# Patient Record
Sex: FEMALE | Race: BLACK OR AFRICAN AMERICAN | NOT HISPANIC OR LATINO | Employment: FULL TIME | URBAN - NONMETROPOLITAN AREA
[De-identification: names, ages, dates, MRNs, and addresses within clinical notes are randomized per-mention and may not be internally consistent; named-entity substitution may affect disease eponyms.]

---

## 2021-02-25 LAB
BILIRUB SERPL-MCNC: NEGATIVE MG/DL
BLOOD URINE, POC: NORMAL
CLARITY, POC UA: CLEAR
COLOR, POC UA: YELLOW
GLUCOSE UR QL STRIP: NEGATIVE
LEUKOCYTE EST, POC UA: NORMAL
NITRITE, POC UA: NEGATIVE
PAP RECOMMENDATION EXT: NORMAL
PAP SMEAR: NORMAL
PH, POC UA: 5.5
POC BETA-HCG (QUAL): NEGATIVE
PROTEIN, POC: NEGATIVE
SPECIFIC GRAVITY, POC UA: 1.01
UROBILINOGEN, POC UA: NORMAL

## 2021-03-04 ENCOUNTER — HISTORICAL (OUTPATIENT)
Dept: ADMINISTRATIVE | Facility: HOSPITAL | Age: 26
End: 2021-03-04

## 2021-03-09 LAB — POC BETA-HCG (QUAL): NEGATIVE

## 2021-03-12 ENCOUNTER — HISTORICAL (OUTPATIENT)
Dept: ADMINISTRATIVE | Facility: HOSPITAL | Age: 26
End: 2021-03-12

## 2021-03-29 LAB — POC BETA-HCG (QUAL): NEGATIVE

## 2021-05-27 LAB
ALBUMIN SERPL-MCNC: 4.2 G/DL (ref 3.4–5)
ALBUMIN/GLOB SERPL: 1.5 {RATIO}
ALP SERPL-CCNC: 64 U/L (ref 50–144)
ALT SERPL-CCNC: 14 U/L (ref 1–45)
AMPHET UR QL SCN: NORMAL
ANION GAP SERPL CALC-SCNC: 6 MMOL/L (ref 7–16)
AST SERPL-CCNC: 21 U/L (ref 14–36)
BARBITURATE SCN PRESENT UR: NORMAL
BASOPHILS # BLD AUTO: 0.02 X10(3)/MCL (ref 0.01–0.08)
BASOPHILS NFR BLD AUTO: 0.3 % (ref 0.1–1.2)
BENZODIAZ UR QL SCN: NORMAL
BILIRUB SERPL-MCNC: 0.41 MG/DL (ref 0.1–1)
BUN SERPL-MCNC: 7 MG/DL (ref 7–20)
CALCIUM SERPL-MCNC: 9.6 MG/DL (ref 8.4–10.2)
CANNABINOIDS UR QL SCN: NORMAL
CHLORIDE SERPL-SCNC: 106 MMOL/L (ref 94–110)
CHOLEST SERPL-MCNC: 177 MG/DL (ref 0–200)
CO2 SERPL-SCNC: 29 MMOL/L (ref 21–32)
COCAINE UR QL SCN: NORMAL
CREAT SERPL-MCNC: 0.8 MG/DL (ref 0.52–1.04)
CREAT/UREA NIT SERPL: 8.8 (ref 12–20)
EOSINOPHIL # BLD AUTO: 0.18 X10(3)/MCL (ref 0.04–0.36)
EOSINOPHIL NFR BLD AUTO: 2.8 % (ref 0.7–7)
ERYTHROCYTE [DISTWIDTH] IN BLOOD BY AUTOMATED COUNT: 13.3 % (ref 11–14.5)
GLOBULIN SER-MCNC: 2.8 G/DL (ref 2–3.9)
GLUCOSE SERPL-MCNC: 110 MGM./DL (ref 70–115)
HCT VFR BLD AUTO: 40 % (ref 36–48)
HDLC SERPL-MCNC: 50 MG/DL (ref 40–60)
HGB BLD-MCNC: 12.9 G/DL (ref 11.8–16)
IMM GRANULOCYTES # BLD AUTO: 0.01 X10E3/UL (ref 0–0.03)
IMM GRANULOCYTES NFR BLD AUTO: 0.2 % (ref 0–0.5)
LDLC SERPL CALC-MCNC: 102.7 MG/DL (ref 30–100)
LYMPHOCYTES # BLD AUTO: 2.25 X10(3)/MCL (ref 1.16–3.74)
LYMPHOCYTES NFR BLD AUTO: 35.2 % (ref 20–55)
MCH RBC QN AUTO: 27.9 PG (ref 27–34)
MCHC RBC AUTO-ENTMCNC: 32.3 G/DL (ref 31–37)
MCV RBC AUTO: 86.6 FL (ref 79–99)
METHADONE UR QL SCN: NORMAL
MONOCYTES # BLD AUTO: 0.38 X10(3)/MCL (ref 0.24–0.36)
MONOCYTES NFR BLD AUTO: 5.9 % (ref 4.7–12.5)
NEUTROPHILS # BLD AUTO: 3.55 X10(3)/MCL (ref 1.56–6.13)
NEUTROPHILS NFR BLD AUTO: 55.6 % (ref 37–73)
OPIATES UR QL SCN: NORMAL
PCP UR QL: NORMAL
PLATELET # BLD AUTO: 370 X10(3)/MCL (ref 140–371)
PMV BLD AUTO: 10.1 FL (ref 9.4–12.4)
POTASSIUM SERPL-SCNC: 4.7 MMOL/L (ref 3.5–5.1)
PROT SERPL-MCNC: 7 G/DL (ref 6.3–8.2)
RBC # BLD AUTO: 4.62 X10(6)/MCL (ref 4–5.1)
SODIUM SERPL-SCNC: 141 MMOL/L (ref 135–145)
TRIGL SERPL-MCNC: 70 MG/DL (ref 30–200)
TSH SERPL-ACNC: 1.39 UIU/ML (ref 0.36–3.74)
WBC # SPEC AUTO: 6.4 X10(3)/MCL (ref 4–11.5)

## 2021-12-21 LAB
HIV 1+2 AB+HIV1 P24 AG SERPL QL IA: NORMAL
RPR SER QL: NORMAL

## 2022-01-16 ENCOUNTER — HISTORICAL (OUTPATIENT)
Dept: ADMINISTRATIVE | Facility: HOSPITAL | Age: 27
End: 2022-01-16

## 2022-04-10 ENCOUNTER — HISTORICAL (OUTPATIENT)
Dept: ADMINISTRATIVE | Facility: HOSPITAL | Age: 27
End: 2022-04-10

## 2022-04-11 ENCOUNTER — HISTORICAL (OUTPATIENT)
Dept: ADMINISTRATIVE | Facility: HOSPITAL | Age: 27
End: 2022-04-11

## 2022-04-27 LAB
AGE: 26
ALBUMIN SERPL-MCNC: 4.7 G/DL (ref 3.4–5)
ALBUMIN/GLOB SERPL: 1.7 {RATIO}
ALP SERPL-CCNC: 64 U/L (ref 50–144)
ALT SERPL-CCNC: 15 U/L (ref 1–45)
ANION GAP SERPL CALC-SCNC: 7 MMOL/L (ref 2–13)
AST SERPL-CCNC: 29 U/L (ref 14–36)
BASOPHILS # BLD AUTO: 0.03 10*3/UL (ref 0.01–0.08)
BASOPHILS NFR BLD AUTO: 0.4 % (ref 0.1–1.2)
BILIRUB SERPL-MCNC: 0.97 MG/DL (ref 0–1)
BUN SERPL-MCNC: 13 MG/DL (ref 7–20)
CALCIUM SERPL-MCNC: 9.6 MG/DL (ref 8.4–10.2)
CHLORIDE SERPL-SCNC: 105 MMOL/L (ref 98–110)
CO2 SERPL-SCNC: 28 MMOL/L (ref 21–32)
CREAT SERPL-MCNC: 1.11 MG/DL (ref 0.52–1.04)
CREAT/UREA NIT SERPL: 11.7 (ref 12–20)
EOSINOPHIL # BLD AUTO: 0.14 10*3/UL (ref 0.04–0.36)
EOSINOPHIL NFR BLD AUTO: 2 % (ref 0.7–7)
ERYTHROCYTE [DISTWIDTH] IN BLOOD BY AUTOMATED COUNT: 12.9 % (ref 11–14.5)
EST. AVERAGE GLUCOSE BLD GHB EST-MCNC: 120 MG/DL (ref 70–115)
GLOBULIN SER-MCNC: 2.7 G/DL (ref 2–3.9)
GLUCOSE SERPL-MCNC: 92 MG/DL (ref 70–115)
HBA1C MFR BLD: 6 % (ref 4–6)
HCT VFR BLD AUTO: 37.4 % (ref 36–48)
HGB BLD-MCNC: 12.8 G/DL (ref 11.8–16)
IMM GRANULOCYTES # BLD AUTO: 0.01 10*3/UL (ref 0–0.03)
IMM GRANULOCYTES NFR BLD AUTO: 0.1 % (ref 0–0.5)
LYMPHOCYTES # BLD AUTO: 2.6 10*3/UL (ref 1.16–3.74)
LYMPHOCYTES NFR BLD AUTO: 36.3 % (ref 20–55)
MANUAL DIFF? (OHS): NORMAL
MCH RBC QN AUTO: 27.6 PG (ref 27–34)
MCHC RBC AUTO-ENTMCNC: 34.2 G/DL (ref 31–37)
MCV RBC AUTO: 80.8 FL (ref 79–99)
MONOCYTES # BLD AUTO: 0.45 10*3/UL (ref 0.24–0.36)
MONOCYTES NFR BLD AUTO: 6.3 % (ref 4.7–12.5)
NEUTROPHILS # BLD AUTO: 3.94 10*3/UL (ref 1.56–6.13)
NEUTROPHILS NFR BLD AUTO: 54.9 % (ref 37–73)
PLATELET # BLD AUTO: 361 10*3/UL (ref 140–371)
PMV BLD AUTO: 9.5 FL (ref 9.4–12.4)
POTASSIUM SERPL-SCNC: 4.1 MMOL/L (ref 3.5–5.1)
PROT SERPL-MCNC: 7.4 G/DL (ref 6.3–8.2)
RBC # BLD AUTO: 4.63 10*6/UL (ref 4–5.1)
SODIUM SERPL-SCNC: 140 MMOL/L (ref 135–145)
WBC # SPEC AUTO: 7.2 10*3/UL (ref 4–11.5)

## 2022-04-28 VITALS
BODY MASS INDEX: 29.48 KG/M2 | OXYGEN SATURATION: 99 % | SYSTOLIC BLOOD PRESSURE: 122 MMHG | DIASTOLIC BLOOD PRESSURE: 62 MMHG | HEIGHT: 58 IN | WEIGHT: 140.44 LBS

## 2022-04-28 VITALS
BODY MASS INDEX: 29.48 KG/M2 | WEIGHT: 140.44 LBS | OXYGEN SATURATION: 99 % | HEIGHT: 58 IN | DIASTOLIC BLOOD PRESSURE: 62 MMHG | SYSTOLIC BLOOD PRESSURE: 122 MMHG

## 2022-05-03 ENCOUNTER — HISTORICAL (OUTPATIENT)
Dept: ADMINISTRATIVE | Facility: HOSPITAL | Age: 27
End: 2022-05-03

## 2022-05-03 NOTE — HISTORICAL [facility] CERNER
This is a historical note converted from Tobias. Formatting and pictures may have been removed.  Please reference Tobias for original formatting and attached multimedia. Chief Complaint  New patient, presents wellness exam and pap + STD testing and labs. ?LMP 2021, UPT negative. Desires permanent sterilization but wants to talk about other contraceptive options until then  Patient not found in Links-unknown Gardasil status  History of Present Illness  25-year-old?black female ?3 para?1 for initial GYN exam.? Patient does have a history of herpes and chlamydia.? Last menstrual period 2021 - usually cyclic but skipped November and December menstrual cycles.? no contraception at this time  atient considering permanent sterilization but does want to discuss other contraceptive options?as well.  adopted fiances 2 children, she and fisolitario do not want more children  c/o right sided breast pain, intermittent x 1 year, denies mass, nipple d/c, dimpling or puckering  MGM and PGM with breast cancer - both age 30s  Gynecological History  Last Menstrual Period: 21  Menstrual Status Intake: Menarcheal  STIs/STDs: Yes  Intermenstrual Bleeding: No  Abnormal Pap: No  Current Birth Control Method: Condoms  Dysmenorrhea: Moderate  HPV Vaccine: No  Flow: Moderate  Dyspareunia: No  Duration of Flow: 5  Postcoital Bleeding: No  Frequency of Cycle: irregular since 2020  Dysuria: No  Additional GYN Information: last pap about 3 years ago--wnl/pt+ hx of herpes and chlamydia  Discharge OB: + brownish, malodorous odor  Breast CA Hx: No  Sexually Active: Yes  Review of Systems  General/Constitutional:?  Chills?denies. Fatigue/weakness?denies?. Fever?denies?. Night sweats?denies?.  Skin:  Rash?denies.  Respiratory:  Cough?denies?. Hemoptysis?denies?. SOB?denies?. Sputum production?denies?. Wheezing?denies?.  Cardiovascular:  Chest pain?denies. Dizziness?denies. Palpitations?denies. Swelling in  hands/feet?denies.  Breast:  Changes in skin of nipple or breast?denies?. Breast lump?denies. Breast pain?denies. Nipple discharge?denies?.  Gastrointestinal:  Abdominal pain?denies?. Blood in stool?denies?. Constipation?denies?. Diarrhea?denies?. Heartburn?denies?. Nausea?denies?. Vomiting?deniesGenitourinary:  Incontinence?denies?. Blood in urine?denies. Frequent urination?denies?. Painful urination?denies.  Gyneocologic:  Irregular menses?denies. Heavy bleeding?denies. Painful menses?denies. Vaginal discharge/ Odor?denies?. Vaginal lesion/pain?denies. ?Pelvic pain?denies?. Decreased libido?denies. Vulvar lesion/ulcer?denies?. Prolapse of genital organs?denies?. Painful intercourse?denies?.  Menopausal:  Hot flushes?denies. Vaginal dryness?denies.  Musculoskeletal:  Joint/mona pain?denies. Decreased ROM?denies. Muscle aches?denies. Swollen joints?denies?. Weakness?denies.  Neurologic:  Confusion?denies. Trouble walking?denies. Trouble with balance?denies?Balance difficulty?denies. Gait abnormalities?denies. Headache?denies. Tingling/Numbness?denies.  Psychiatric:  Mood lability?denies.?Anxiety or panic attacks?denies. Depressed mood?denies. Suicidal thoughts?denies.?  Physical Exam  Vitals & Measurements  T:?36.3? ?C (Temporal Artery)? BP:?92/60?  HT:?147.00?cm? WT:?61.100?kg? BMI:?28.28? LMP:?01/23/2021 00:00 CST?  Chaperone: present  ?   General appearance: - healthy, well-nourished and well-developed  ?   Psychiatric: Orientation to time, place and person. Normal mood and affect and active, alert  ?   Skin:  Appearance: no rashes or lesions  ?   Neck:  Neck: supple, FROM, trachea midline. and no masses  Thyroid: no enlargement or nodules and non-tender.  ?  ?   Cardiovascular:  Auscultation: RRR and no murmur  Peripheral Vascular: no varicosities, LLE edema, RLE edema, calf tenderness, and palpable cord and pedal pulses intact  ?   Lungs:  Respiratory effort: no intercostal retractions or accessory muscle  usage  Auscultation: no wheezing, rales/crackles, or rhonchi and clear to auscultation  ?  Breast:  Inspection/Palpation: no tenderness, discrete/distinct masses, skin changes, or abnormal secretions. Nipple appearance normal.  ?  Abdomen:  Auscultation/Inspection/Palpation: no hepatomegaly, splenomegaly, masses , tenderness? or CVA tenderness and soft, non distended bowel sounds preset  Hernia: no palpable hernias  ?  Female Genitalia:  Vulva: no masses,?tenderness or?lesions  Bladder/Urethra: no urethral discharge or mass, normal meatus, bladder non-distended  Vagina: no tenderness, erythema, cystocele, rectocele, +abnormal vaginal discharge, or vesicle(s) or ulcers  Cervix: no discharge , no cervical lacerations noted or motion tenderness and grossly normal  Uterus: normal size and shape and midline, non-tender, and no uterine prolapse.  Adnexa/Parametria: no parametrial tenderness or mass, no adnexal tenderness or ovarian mass  ?  Lymph Nodes:  Palpation: non tender submandibular nodes, axillary nodes, or inguinal nodes  ?  Rectal Exam:  Rectum: normal perianal skin  Assessment/Plan  1.?Encounter for gynecological examination (general) (routine) with abnormal findings?Z01.411  ?Well woman:   - pap, gc/cz   - advised patient if she notices any changes to her breasts, including a lump, mass, dimpling, discharge, rash or tenderness, to should contact us immediately   - healthy diet, exercise   - multivitamin   - seat belt   - sunscreen use   - safe sex/ STI education   - contraception evaluation   - Gardasil evaluation   ?  ?  Ordered:  Clinic Follow up, *Est. 02/25/22 3:00:00 CST, Order for future visit, Encounter for gynecological examination (general) (routine) with abnormal findings, EBENEZER Gamble KARLY  Counts include 234 beds at the [facility] 18-39 years [id number] , Encounter for gynecological examination (general) (routine) with abnormal findings, EBENEZER GE, 02/25/21 11:17:00 CST  ?  2.?Family history of breast  cancer in female?Z80.3  ?MMG - baseline  ?  3.?BV (bacterial vaginosis)?N76.0  ?flagyl  ?  4.?Contraceptive management?Z30.9  ?IUD - to be ordered and inserted during menses  ?  Referrals  Clinic Follow up, *Est. 22 3:00:00 CST, Order for future visit, Encounter for gynecological examination (general) (routine) with abnormal findings, EBENEZER Gamble [facility]   OB History  Pregnancy History???(1,0,2,1)?? ??  Pregnancy # 1  Baby 1?????????????Outcome Date:? ??? Outcome:?Fetal Death  ???Outcome or Result:?Ectopic, Laparotomy  ???Gender:?--????????Gest Age:?Unknown ??????Wt:?--  ???Hospital:?--????????Jadiel Labor:?--  ???Samy Name:?--?????Babys Father:?--  ?  Pregnancy # 2  Baby 1?????????????Outcome Date:? ??? Outcome:?Fetal Death  ???Outcome or Result:?Spontaneous  with D&C  ???Gender:?--????????Gest Age:?Unknown ??????Wt:?--  ???Hospital:?--????????Jad[facility] Labor:?--  ???Samy Name:?--?????Babys Father:?--  ?  Pregnancy # 3  Baby 1?????????????Outcome Date:?2019????? Outcome:?Live Birth  ???Outcome or Result:?Vaginal  ???Gender:?Male????????Gest Age:?Fullterm ??????Wt:??2381 g  ???Hospital:?--????????Jad[facility] Labor:?--  ???Samy Name:?--?????Babys Father:?--  ???Anesthesia Type:?Epidural  Problem List/Past Medical History  Ongoing  Anxiety  Depression  Herpes genitalis  Historical  Pregnant  Pregnant  Pregnant  Procedure/Surgical History  D&C - Dilatation and curettage ()  Ectopic, laparotomy ()   Medications  clonazePAM 0.5 mg oral tablet, 0.5 mg= 1 tab(s), Oral, BID  escitalopram 20 mg oral tablet, 20 mg= 1 tab(s), Oral, Daily  Allergies  traMADol?(Vertigo)  Social History  Abuse/Neglect  No, 2021  Alcohol  Current, 1-2 times per month, 2021  Sexual  Sexually active: Yes. Other contraceptive use: condoms. Yes, 2021  Tobacco  Never (less than 100 in lifetime), N/A, 2021  Marital Status  Single  Family History  Breast cancer: Maternal  Grandmother and Paternal Grandmother.  Health Maintenance  Health Maintenance  ???Pending?(in the next year)  ??? ??Due?  ??? ? ? ?Alcohol Misuse Screening due??01/02/21??and every 1??year(s)  ??? ? ? ?ADL Screening due??02/25/21??and every 1??year(s)  ??? ? ? ?Tetanus Vaccine due??02/25/21??and every 10??year(s)  ??? ??Due In Future?  ??? ? ? ?Obesity Screening not due until??01/01/22??and every 1??year(s)  ???Satisfied?(in the past 1 year)  ??? ??Satisfied?  ??? ? ? ?Blood Pressure Screening on??02/25/21.??Satisfied by Sole Cazares LPN  ??? ? ? ?Body Mass Index Check on??02/25/21.??Satisfied by Sole Cazares LPN  ??? ? ? ?Influenza Vaccine on??02/25/21.??Satisfied by Sage CANO Sole  ??? ? ? ?Obesity Screening on??02/25/21.??Satisfied by Sage CANO Sole  ?

## 2022-06-10 ENCOUNTER — HISTORICAL (OUTPATIENT)
Dept: ADMINISTRATIVE | Facility: HOSPITAL | Age: 27
End: 2022-06-10

## 2022-06-24 DIAGNOSIS — K42.9 UMBILICAL HERNIA WITHOUT OBSTRUCTION OR GANGRENE: Primary | ICD-10-CM

## 2022-06-27 ENCOUNTER — CLINICAL SUPPORT (OUTPATIENT)
Dept: RESPIRATORY THERAPY | Facility: HOSPITAL | Age: 27
End: 2022-06-27
Attending: SURGERY
Payer: MEDICAID

## 2022-06-27 ENCOUNTER — HOSPITAL ENCOUNTER (OUTPATIENT)
Dept: RADIOLOGY | Facility: HOSPITAL | Age: 27
Discharge: HOME | End: 2022-06-27
Attending: SURGERY
Payer: MEDICAID

## 2022-06-27 DIAGNOSIS — Z01.811 PRE-OP CHEST EXAM: ICD-10-CM

## 2022-06-27 DIAGNOSIS — K42.9 UMBILICAL HERNIA WITHOUT OBSTRUCTION OR GANGRENE: ICD-10-CM

## 2022-06-27 PROCEDURE — 93005 ELECTROCARDIOGRAM TRACING: CPT

## 2022-06-27 PROCEDURE — 71046 X-RAY EXAM CHEST 2 VIEWS: CPT | Mod: TC

## 2022-06-27 RX ORDER — HYDROXYZINE HYDROCHLORIDE 50 MG/1
1 TABLET, FILM COATED ORAL NIGHTLY
COMMUNITY
Start: 2022-06-13 | End: 2023-03-31 | Stop reason: ALTCHOICE

## 2022-06-27 RX ORDER — METFORMIN HYDROCHLORIDE 500 MG/1
500 TABLET, EXTENDED RELEASE ORAL NIGHTLY
COMMUNITY
Start: 2022-06-07 | End: 2023-03-31 | Stop reason: ALTCHOICE

## 2022-06-27 RX ORDER — BUSPIRONE HYDROCHLORIDE 5 MG/1
5 TABLET ORAL 3 TIMES DAILY
COMMUNITY
Start: 2022-06-13 | End: 2023-03-31 | Stop reason: ALTCHOICE

## 2022-06-27 RX ORDER — VALACYCLOVIR HYDROCHLORIDE 1 G/1
1000 TABLET, FILM COATED ORAL 2 TIMES DAILY PRN
COMMUNITY
Start: 2022-06-15 | End: 2023-06-14 | Stop reason: SDUPTHER

## 2022-06-27 RX ORDER — SERTRALINE HYDROCHLORIDE 100 MG/1
100 TABLET, FILM COATED ORAL NIGHTLY
COMMUNITY
Start: 2022-06-13 | End: 2023-03-31 | Stop reason: SDUPTHER

## 2022-06-27 NOTE — DISCHARGE INSTRUCTIONS
Use CHG wipes as directed. Follow prep on Wednesday. Nothing to eat or drink after midnight.     Discharge instructions:  Showers only, no baths, no swimming, keep incisions clean with soap and water daily, may take gas-x and/ or stool softener daily if needed, do not lift heavier than 10 lbs until seen by Dr. Duran, wear abdominal binder at all times except in shower

## 2022-06-29 ENCOUNTER — ANESTHESIA EVENT (OUTPATIENT)
Dept: SURGERY | Facility: HOSPITAL | Age: 27
End: 2022-06-29
Payer: MEDICAID

## 2022-06-29 NOTE — ANESTHESIA PREPROCEDURE EVALUATION
06/29/2022  Anahi Reza is a 26 y.o., female.      Pre-op Assessment    I have reviewed the Patient Summary Reports.     I have reviewed the Nursing Notes. I have reviewed the NPO Status.   I have reviewed the Medications.     Review of Systems  Anesthesia Hx:  Denies Family Hx of Anesthesia complications.   Denies Personal Hx of Anesthesia complications.   Social:  Non-Smoker, Alcohol Use    Hematology/Oncology:  Hematology Normal   Oncology Normal     EENT/Dental:EENT/Dental Normal   Cardiovascular:  Cardiovascular Normal     Pulmonary:  Pulmonary Normal    Hepatic/GI:  Hepatic/GI Normal    Musculoskeletal:  Musculoskeletal Normal    Neurological:  Neurology Normal    Endocrine:  Endocrine Normal    Dermatological:  Skin Normal    Psych:   Psychiatric History          Physical Exam  General: Cooperative, Alert and Oriented    Airway:  Mallampati: II   Mouth Opening: Normal  TM Distance: Normal  Tongue: Normal  Neck ROM: Normal ROM    Dental:  Intact        Anesthesia Plan  Type of Anesthesia, risks & benefits discussed:    Anesthesia Type: Gen ETT  Intra-op Monitoring Plan: Standard ASA Monitors  Induction:  IV  Airway Plan: Direct  Informed Consent: Informed consent signed with the Patient and all parties understand the risks and agree with anesthesia plan.  All questions answered. Patient consented to blood products? Yes  ASA Score: 1    Ready For Surgery From Anesthesia Perspective.     .

## 2022-06-30 ENCOUNTER — ANESTHESIA (OUTPATIENT)
Dept: SURGERY | Facility: HOSPITAL | Age: 27
End: 2022-06-30
Payer: MEDICAID

## 2022-06-30 ENCOUNTER — HOSPITAL ENCOUNTER (OUTPATIENT)
Facility: HOSPITAL | Age: 27
Discharge: HOME | End: 2022-06-30
Attending: SURGERY | Admitting: SURGERY
Payer: MEDICAID

## 2022-06-30 VITALS
OXYGEN SATURATION: 96 % | BODY MASS INDEX: 30.23 KG/M2 | HEART RATE: 97 BPM | HEIGHT: 58 IN | RESPIRATION RATE: 20 BRPM | SYSTOLIC BLOOD PRESSURE: 113 MMHG | TEMPERATURE: 97 F | WEIGHT: 144 LBS | DIASTOLIC BLOOD PRESSURE: 67 MMHG

## 2022-06-30 DIAGNOSIS — K42.9 UMBILICAL HERNIA WITHOUT OBSTRUCTION AND WITHOUT GANGRENE: Primary | ICD-10-CM

## 2022-06-30 DIAGNOSIS — K42.9 UMBILICAL HERNIA: ICD-10-CM

## 2022-06-30 LAB — B-HCG SERPL QL: NEGATIVE

## 2022-06-30 PROCEDURE — 25000003 PHARM REV CODE 250: Performed by: SURGERY

## 2022-06-30 PROCEDURE — 37000008 HC ANESTHESIA 1ST 15 MINUTES: Performed by: SURGERY

## 2022-06-30 PROCEDURE — 63600175 PHARM REV CODE 636 W HCPCS: Performed by: ANESTHESIOLOGY

## 2022-06-30 PROCEDURE — 63600175 PHARM REV CODE 636 W HCPCS: Performed by: SURGERY

## 2022-06-30 PROCEDURE — 27201423 OPTIME MED/SURG SUP & DEVICES STERILE SUPPLY: Performed by: SURGERY

## 2022-06-30 PROCEDURE — 25000003 PHARM REV CODE 250: Performed by: ANESTHESIOLOGY

## 2022-06-30 PROCEDURE — 00790 ANES IPER UPR ABD NOS: CPT | Performed by: SURGERY

## 2022-06-30 PROCEDURE — 71000015 HC POSTOP RECOV 1ST HR: Performed by: SURGERY

## 2022-06-30 PROCEDURE — C1781 MESH (IMPLANTABLE): HCPCS | Performed by: SURGERY

## 2022-06-30 PROCEDURE — 71000016 HC POSTOP RECOV ADDL HR: Performed by: SURGERY

## 2022-06-30 PROCEDURE — 81025 URINE PREGNANCY TEST: CPT | Performed by: SURGERY

## 2022-06-30 PROCEDURE — 25000003 PHARM REV CODE 250: Performed by: NURSE ANESTHETIST, CERTIFIED REGISTERED

## 2022-06-30 PROCEDURE — 36000710: Performed by: SURGERY

## 2022-06-30 PROCEDURE — 37000009 HC ANESTHESIA EA ADD 15 MINS: Performed by: SURGERY

## 2022-06-30 PROCEDURE — 71000033 HC RECOVERY, INTIAL HOUR: Performed by: SURGERY

## 2022-06-30 PROCEDURE — 36000711: Performed by: SURGERY

## 2022-06-30 PROCEDURE — 63600175 PHARM REV CODE 636 W HCPCS: Performed by: NURSE ANESTHETIST, CERTIFIED REGISTERED

## 2022-06-30 DEVICE — MESH LAP PG 10X15 DF FLATSHEET: Type: IMPLANTABLE DEVICE | Site: ABDOMEN | Status: FUNCTIONAL

## 2022-06-30 RX ORDER — SODIUM CHLORIDE, SODIUM LACTATE, POTASSIUM CHLORIDE, CALCIUM CHLORIDE 600; 310; 30; 20 MG/100ML; MG/100ML; MG/100ML; MG/100ML
INJECTION, SOLUTION INTRAVENOUS CONTINUOUS
Status: ACTIVE | OUTPATIENT
Start: 2022-06-30

## 2022-06-30 RX ORDER — ONDANSETRON 2 MG/ML
INJECTION INTRAMUSCULAR; INTRAVENOUS
Status: DISCONTINUED | OUTPATIENT
Start: 2022-06-30 | End: 2022-06-30

## 2022-06-30 RX ORDER — SODIUM CHLORIDE 9 MG/ML
INJECTION, SOLUTION INTRAVENOUS CONTINUOUS
Status: DISCONTINUED | OUTPATIENT
Start: 2022-06-30 | End: 2022-06-30 | Stop reason: HOSPADM

## 2022-06-30 RX ORDER — PHENYLEPHRINE HYDROCHLORIDE 10 MG/ML
INJECTION INTRAVENOUS
Status: DISCONTINUED | OUTPATIENT
Start: 2022-06-30 | End: 2022-06-30

## 2022-06-30 RX ORDER — PROPOFOL 10 MG/ML
VIAL (ML) INTRAVENOUS
Status: DISCONTINUED | OUTPATIENT
Start: 2022-06-30 | End: 2022-06-30

## 2022-06-30 RX ORDER — GABAPENTIN 300 MG/1
600 CAPSULE ORAL
Status: COMPLETED | OUTPATIENT
Start: 2022-06-30 | End: 2022-06-30

## 2022-06-30 RX ORDER — ROCURONIUM BROMIDE 10 MG/ML
INJECTION, SOLUTION INTRAVENOUS
Status: DISCONTINUED | OUTPATIENT
Start: 2022-06-30 | End: 2022-06-30

## 2022-06-30 RX ORDER — SODIUM CHLORIDE 0.9 % (FLUSH) 0.9 %
10 SYRINGE (ML) INJECTION
Status: ACTIVE | OUTPATIENT
Start: 2022-06-30

## 2022-06-30 RX ORDER — ONDANSETRON 2 MG/ML
4 INJECTION INTRAMUSCULAR; INTRAVENOUS ONCE
Status: CANCELLED | OUTPATIENT
Start: 2022-06-30

## 2022-06-30 RX ORDER — ONDANSETRON 2 MG/ML
4 INJECTION INTRAMUSCULAR; INTRAVENOUS ONCE
Status: COMPLETED | OUTPATIENT
Start: 2022-06-30 | End: 2022-06-30

## 2022-06-30 RX ORDER — FENTANYL CITRATE 50 UG/ML
INJECTION, SOLUTION INTRAMUSCULAR; INTRAVENOUS
Status: DISCONTINUED | OUTPATIENT
Start: 2022-06-30 | End: 2022-06-30

## 2022-06-30 RX ORDER — DEXAMETHASONE SODIUM PHOSPHATE 4 MG/ML
INJECTION, SOLUTION INTRA-ARTICULAR; INTRALESIONAL; INTRAMUSCULAR; INTRAVENOUS; SOFT TISSUE
Status: DISCONTINUED | OUTPATIENT
Start: 2022-06-30 | End: 2022-06-30

## 2022-06-30 RX ORDER — BUPIVACAINE HYDROCHLORIDE 5 MG/ML
INJECTION, SOLUTION EPIDURAL; INTRACAUDAL
Status: DISCONTINUED | OUTPATIENT
Start: 2022-06-30 | End: 2022-06-30 | Stop reason: HOSPADM

## 2022-06-30 RX ORDER — CEFAZOLIN SODIUM 1 G/3ML
INJECTION, POWDER, FOR SOLUTION INTRAMUSCULAR; INTRAVENOUS
Status: DISCONTINUED | OUTPATIENT
Start: 2022-06-30 | End: 2022-06-30

## 2022-06-30 RX ORDER — CEFAZOLIN SODIUM 2 G/50ML
2 SOLUTION INTRAVENOUS
Status: DISPENSED | OUTPATIENT
Start: 2022-06-30 | End: 2022-07-01

## 2022-06-30 RX ORDER — LIDOCAINE HYDROCHLORIDE 20 MG/ML
INJECTION, SOLUTION EPIDURAL; INFILTRATION; INTRACAUDAL; PERINEURAL
Status: DISCONTINUED | OUTPATIENT
Start: 2022-06-30 | End: 2022-06-30

## 2022-06-30 RX ORDER — ACETAMINOPHEN 500 MG
1000 TABLET ORAL
Status: COMPLETED | OUTPATIENT
Start: 2022-06-30 | End: 2022-06-30

## 2022-06-30 RX ORDER — HYDROCODONE BITARTRATE AND ACETAMINOPHEN 5; 325 MG/1; MG/1
1 TABLET ORAL EVERY 6 HOURS PRN
Status: DISCONTINUED | OUTPATIENT
Start: 2022-06-30 | End: 2022-06-30 | Stop reason: HOSPADM

## 2022-06-30 RX ORDER — KETOROLAC TROMETHAMINE 30 MG/ML
30 INJECTION, SOLUTION INTRAMUSCULAR; INTRAVENOUS ONCE
Status: COMPLETED | OUTPATIENT
Start: 2022-06-30 | End: 2022-06-30

## 2022-06-30 RX ADMIN — ONDANSETRON 4 MG: 2 INJECTION INTRAMUSCULAR; INTRAVENOUS at 08:06

## 2022-06-30 RX ADMIN — DEXAMETHASONE SODIUM PHOSPHATE 8 MG: 4 INJECTION, SOLUTION INTRA-ARTICULAR; INTRALESIONAL; INTRAMUSCULAR; INTRAVENOUS; SOFT TISSUE at 04:06

## 2022-06-30 RX ADMIN — KETOROLAC TROMETHAMINE 30 MG: 30 INJECTION, SOLUTION INTRAMUSCULAR; INTRAVENOUS at 08:06

## 2022-06-30 RX ADMIN — ONDANSETRON 4 MG: 2 INJECTION INTRAMUSCULAR; INTRAVENOUS at 04:06

## 2022-06-30 RX ADMIN — ACETAMINOPHEN 1000 MG: 500 TABLET, FILM COATED ORAL at 12:06

## 2022-06-30 RX ADMIN — ROCURONIUM BROMIDE 30 MG: 10 INJECTION, SOLUTION INTRAVENOUS at 04:06

## 2022-06-30 RX ADMIN — PHENYLEPHRINE HYDROCHLORIDE 200 MCG: 10 INJECTION INTRAVENOUS at 05:06

## 2022-06-30 RX ADMIN — FENTANYL CITRATE 100 MCG: 50 INJECTION, SOLUTION INTRAMUSCULAR; INTRAVENOUS at 04:06

## 2022-06-30 RX ADMIN — SUGAMMADEX 200 MG: 100 INJECTION, SOLUTION INTRAVENOUS at 06:06

## 2022-06-30 RX ADMIN — CEFAZOLIN 2 G: 330 INJECTION, POWDER, FOR SOLUTION INTRAMUSCULAR; INTRAVENOUS at 04:06

## 2022-06-30 RX ADMIN — LIDOCAINE HYDROCHLORIDE 50 MG: 20 INJECTION, SOLUTION EPIDURAL; INFILTRATION; INTRACAUDAL; PERINEURAL at 04:06

## 2022-06-30 RX ADMIN — HYDROCODONE BITARTRATE AND ACETAMINOPHEN 1 TABLET: 5; 325 TABLET ORAL at 08:06

## 2022-06-30 RX ADMIN — PROPOFOL 150 MG: 10 INJECTION, EMULSION INTRAVENOUS at 04:06

## 2022-06-30 RX ADMIN — ROCURONIUM BROMIDE 20 MG: 10 INJECTION, SOLUTION INTRAVENOUS at 05:06

## 2022-06-30 RX ADMIN — GABAPENTIN 600 MG: 300 CAPSULE ORAL at 12:06

## 2022-06-30 RX ADMIN — SODIUM CHLORIDE, POTASSIUM CHLORIDE, SODIUM LACTATE AND CALCIUM CHLORIDE: 600; 310; 30; 20 INJECTION, SOLUTION INTRAVENOUS at 12:06

## 2022-06-30 NOTE — TRANSFER OF CARE
Anesthesia Transfer of Care Note    Patient: Anahi Reza    Procedure(s) Performed: Procedure(s) (LRB):  ROBOTIC REPAIR, HERNIA, UMBILICAL (N/A)    Patient location: PACU    Anesthesia Type: general    Transport from OR: Transported from OR on room air with adequate spontaneous ventilation    Post pain: adequate analgesia    Post assessment: no apparent anesthetic complications    Post vital signs: stable    Level of consciousness: unresponsive    Nausea/Vomiting: no nausea/vomiting    Complications: none    Transfer of care protocol was followed      Last vitals:

## 2022-06-30 NOTE — ANESTHESIA POSTPROCEDURE EVALUATION
Anesthesia Post Evaluation    Patient: Anahi Reza    Procedure(s) Performed: Procedure(s) (LRB):  ROBOTIC REPAIR, HERNIA, UMBILICAL (N/A)    Final Anesthesia Type: general      Patient location during evaluation: PACU  Patient participation: Yes- Able to Participate  Level of consciousness: awake and alert and oriented  Post-procedure vital signs: reviewed and stable  Pain management: adequate  Airway patency: patent    PONV status at discharge: No PONV  Anesthetic complications: no      Cardiovascular status: stable  Respiratory status: unassisted, spontaneous ventilation and room air  Hydration status: euvolemic  Follow-up not needed.          Vitals Value Taken Time   BP  06/30/22 1837   Temp  06/30/22 1837   Pulse  06/30/22 1837   Resp  06/30/22 1837   SpO2  06/30/22 1837         No case tracking events are documented in the log.      Pain/Aparna Score: Pain Rating Prior to Med Admin: 0 (6/30/2022 12:51 PM)

## 2022-07-01 NOTE — DISCHARGE SUMMARY
[facility] General - Periop Services  Discharge Note  Short Stay    Procedure(s) (LRB):  ROBOTIC REPAIR, HERNIA, UMBILICAL (N/A)    OUTCOME: Patient tolerated treatment/procedure well without complication and is now ready for discharge.    DISPOSITION: Home or Self Care    FINAL DIAGNOSIS:  Umbilical hernia without obstruction and without gangrene    FOLLOWUP: In clinic    DISCHARGE INSTRUCTIONS:    Discharge Procedure Orders   Diet general         Clinical Reference Documents Added to Patient Instructions       Document    UMBILICAL HERNIA, ADULT (ENGLISH)          TIME SPENT ON DISCHARGE:   5 minutes

## 2022-07-01 NOTE — OP NOTE
[facility] General - Periop Services  Operative Note      Date of Procedure: 6/30/2022     Procedure: Procedure(s) (LRB):  ROBOTIC REPAIR, HERNIA, UMBILICAL (N/A)     Surgeon(s) and Role:     * Isiah Duran MD - Primary    Assisting Surgeon: None    Pre-Operative Diagnosis:  Umbilical hernia nonobstructed not incarcerated    Post-Operative Diagnosis: Post-Op Diagnosis Codes:     * Umbilical hernia without obstruction and without gangrene [K42.9]  Anesthesia: General    Operative Findings (including complications, if any):  Patient is a [age]-year-old  female with a history of diabetes on metformin anxiety depression 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    Patient was brought up from supine position general anesthetic prepped and draped in sterile fashion had a left upper quadrant incision made with insertion of the Veress needle .  5 mm trocar was placed under direct vision and then 3 8 mm robotic trocars were placed in the left lateral position at at 80 degree turn to the right with left side up patient then was noted to have a very loose attenuated fascia a small umbilical hernia was noted and oversewn transversely with a running Prolene peritoneal pocket was created a 10 x 15 cm ProGrip  mesh was sutured into place with absorbable V lock suture 2-0.  Patient had the peritoneum reapproximated over the mesh .  The adhesive side was toward the posterior rectus fascia the nonadhesive side was toward the peritoneum      Description of Technical Procedures:  Noted above     Significant Surgical Tasks Conducted by the Assistant(s), if Applicable:  None     Estimated Blood Loss (EBL): * No values recorded between 6/30/2022  4:56 PM and 6/30/2022  6:32 PM *           Implants:   Implant Name Type Inv. Item Serial No.  Lot No. LRB No. Used Action   MESH LAP PG [id number] [facility] - [id number]  MESH LAP PG  10X15  LELE  JUSTIN [id number] N/A 1 Implanted       Specimens:   Specimen (24h ago, onward)            None                  Condition: Good    Disposition: PACU - hemodynamically stable.    Attestation: I was present and scrubbed for the entire procedure.    Discharge Note    OUTCOME: Patient tolerated treatment/procedure well without complication and is now ready for discharge.    DISPOSITION: Home or Self Care    FINAL DIAGNOSIS:  Umbilical hernia without obstruction and without gangrene    FOLLOWUP: In clinic    DISCHARGE INSTRUCTIONS:    Discharge Procedure Orders   Diet general        Clinical Reference Documents Added to Patient Instructions       Document    UMBILICAL HERNIA, ADULT (ENGLISH)

## 2022-08-02 ENCOUNTER — HISTORICAL (OUTPATIENT)
Dept: ADMINISTRATIVE | Facility: HOSPITAL | Age: 27
End: 2022-08-02

## 2022-09-21 ENCOUNTER — HISTORICAL (OUTPATIENT)
Dept: ADMINISTRATIVE | Facility: HOSPITAL | Age: 27
End: 2022-09-21
Payer: MEDICAID

## 2022-09-30 ENCOUNTER — HISTORICAL (OUTPATIENT)
Dept: ADMINISTRATIVE | Facility: HOSPITAL | Age: 27
End: 2022-09-30

## 2022-10-24 ENCOUNTER — HISTORICAL (OUTPATIENT)
Dept: ADMINISTRATIVE | Facility: HOSPITAL | Age: 27
End: 2022-10-24

## 2023-02-01 ENCOUNTER — DOCUMENTATION ONLY (OUTPATIENT)
Dept: ADMINISTRATIVE | Facility: HOSPITAL | Age: 28
End: 2023-02-01
Payer: MEDICAID

## 2023-03-31 ENCOUNTER — OFFICE VISIT (OUTPATIENT)
Dept: FAMILY MEDICINE | Facility: CLINIC | Age: 28
End: 2023-03-31
Payer: MEDICAID

## 2023-03-31 ENCOUNTER — OFFICE VISIT (OUTPATIENT)
Dept: BEHAVIORAL HEALTH | Facility: CLINIC | Age: 28
End: 2023-03-31
Payer: MEDICAID

## 2023-03-31 VITALS
BODY MASS INDEX: 33.97 KG/M2 | HEIGHT: 58 IN | SYSTOLIC BLOOD PRESSURE: 110 MMHG | DIASTOLIC BLOOD PRESSURE: 80 MMHG | TEMPERATURE: 98 F | WEIGHT: 161.81 LBS | OXYGEN SATURATION: 98 % | HEART RATE: 90 BPM

## 2023-03-31 VITALS
TEMPERATURE: 98 F | OXYGEN SATURATION: 97 % | SYSTOLIC BLOOD PRESSURE: 110 MMHG | BODY MASS INDEX: 33.37 KG/M2 | HEART RATE: 87 BPM | HEIGHT: 58 IN | DIASTOLIC BLOOD PRESSURE: 70 MMHG | WEIGHT: 159 LBS

## 2023-03-31 DIAGNOSIS — F43.10 PTSD (POST-TRAUMATIC STRESS DISORDER): Primary | Chronic | ICD-10-CM

## 2023-03-31 DIAGNOSIS — K21.9 GASTROESOPHAGEAL REFLUX DISEASE, UNSPECIFIED WHETHER ESOPHAGITIS PRESENT: Primary | ICD-10-CM

## 2023-03-31 DIAGNOSIS — F33.9 RECURRENT MAJOR DEPRESSIVE DISORDER, REMISSION STATUS UNSPECIFIED: Chronic | ICD-10-CM

## 2023-03-31 DIAGNOSIS — F41.1 GENERALIZED ANXIETY DISORDER: Chronic | ICD-10-CM

## 2023-03-31 DIAGNOSIS — K31.84 GASTROPARESIS: ICD-10-CM

## 2023-03-31 DIAGNOSIS — G47.00 INSOMNIA, UNSPECIFIED TYPE: Chronic | ICD-10-CM

## 2023-03-31 PROBLEM — F33.1 MODERATE EPISODE OF RECURRENT MAJOR DEPRESSIVE DISORDER: Status: ACTIVE | Noted: 2023-03-31

## 2023-03-31 PROBLEM — F43.12 CHRONIC POST-TRAUMATIC STRESS DISORDER (PTSD): Status: ACTIVE | Noted: 2023-03-31

## 2023-03-31 PROBLEM — R73.03 PREDIABETES: Status: ACTIVE | Noted: 2023-03-31

## 2023-03-31 PROBLEM — K42.9 UMBILICAL HERNIA: Status: ACTIVE | Noted: 2022-06-30

## 2023-03-31 PROBLEM — Z62.810 HISTORY OF SEXUAL ABUSE IN CHILDHOOD: Status: ACTIVE | Noted: 2023-03-31

## 2023-03-31 PROBLEM — Z91.51 HISTORY OF SUICIDE ATTEMPT: Status: ACTIVE | Noted: 2023-03-31

## 2023-03-31 PROBLEM — A60.00 GENITAL HERPES SIMPLEX: Status: ACTIVE | Noted: 2023-03-31

## 2023-03-31 PROCEDURE — 3008F BODY MASS INDEX DOCD: CPT | Mod: CPTII,,, | Performed by: NURSE PRACTITIONER

## 2023-03-31 PROCEDURE — 1160F RVW MEDS BY RX/DR IN RCRD: CPT | Mod: CPTII,,, | Performed by: NURSE PRACTITIONER

## 2023-03-31 PROCEDURE — 3074F SYST BP LT 130 MM HG: CPT | Mod: CPTII,,, | Performed by: NURSE PRACTITIONER

## 2023-03-31 PROCEDURE — 99214 PR OFFICE/OUTPT VISIT, EST, LEVL IV, 30-39 MIN: ICD-10-PCS | Mod: SA,HB,, | Performed by: NURSE PRACTITIONER

## 2023-03-31 PROCEDURE — 99213 PR OFFICE/OUTPT VISIT, EST, LEVL III, 20-29 MIN: ICD-10-PCS | Mod: ,,, | Performed by: NURSE PRACTITIONER

## 2023-03-31 PROCEDURE — 99214 OFFICE O/P EST MOD 30 MIN: CPT | Mod: SA,HB,, | Performed by: NURSE PRACTITIONER

## 2023-03-31 PROCEDURE — 1160F PR REVIEW ALL MEDS BY PRESCRIBER/CLIN PHARMACIST DOCUMENTED: ICD-10-PCS | Mod: CPTII,,, | Performed by: NURSE PRACTITIONER

## 2023-03-31 PROCEDURE — 3078F PR MOST RECENT DIASTOLIC BLOOD PRESSURE < 80 MM HG: ICD-10-PCS | Mod: CPTII,,, | Performed by: NURSE PRACTITIONER

## 2023-03-31 PROCEDURE — 3074F PR MOST RECENT SYSTOLIC BLOOD PRESSURE < 130 MM HG: ICD-10-PCS | Mod: CPTII,,, | Performed by: NURSE PRACTITIONER

## 2023-03-31 PROCEDURE — 1159F MED LIST DOCD IN RCRD: CPT | Mod: CPTII,,, | Performed by: NURSE PRACTITIONER

## 2023-03-31 PROCEDURE — 1159F PR MEDICATION LIST DOCUMENTED IN MEDICAL RECORD: ICD-10-PCS | Mod: CPTII,,, | Performed by: NURSE PRACTITIONER

## 2023-03-31 PROCEDURE — 3008F PR BODY MASS INDEX (BMI) DOCUMENTED: ICD-10-PCS | Mod: CPTII,,, | Performed by: NURSE PRACTITIONER

## 2023-03-31 PROCEDURE — 3079F DIAST BP 80-89 MM HG: CPT | Mod: CPTII,,, | Performed by: NURSE PRACTITIONER

## 2023-03-31 PROCEDURE — 3078F DIAST BP <80 MM HG: CPT | Mod: CPTII,,, | Performed by: NURSE PRACTITIONER

## 2023-03-31 PROCEDURE — 99213 OFFICE O/P EST LOW 20 MIN: CPT | Mod: ,,, | Performed by: NURSE PRACTITIONER

## 2023-03-31 PROCEDURE — 3079F PR MOST RECENT DIASTOLIC BLOOD PRESSURE 80-89 MM HG: ICD-10-PCS | Mod: CPTII,,, | Performed by: NURSE PRACTITIONER

## 2023-03-31 RX ORDER — IBUPROFEN 800 MG/1
800 TABLET ORAL EVERY 6 HOURS PRN
COMMUNITY
Start: 2022-03-16

## 2023-03-31 RX ORDER — BUSPIRONE HYDROCHLORIDE 15 MG/1
15 TABLET ORAL 3 TIMES DAILY
COMMUNITY
Start: 2023-02-28 | End: 2023-03-31 | Stop reason: ALTCHOICE

## 2023-03-31 RX ORDER — CLONAZEPAM 0.5 MG/1
0.5 TABLET ORAL 2 TIMES DAILY PRN
Qty: 30 TABLET | Refills: 0 | Status: SHIPPED | OUTPATIENT
Start: 2023-03-31 | End: 2023-05-23 | Stop reason: SDUPTHER

## 2023-03-31 RX ORDER — HYDROXYZINE HYDROCHLORIDE 50 MG/1
75 TABLET, FILM COATED ORAL DAILY PRN
Qty: 45 TABLET | Refills: 1 | Status: SHIPPED | OUTPATIENT
Start: 2023-03-31 | End: 2023-07-26 | Stop reason: SDUPTHER

## 2023-03-31 RX ORDER — SERTRALINE HYDROCHLORIDE 100 MG/1
150 TABLET, FILM COATED ORAL NIGHTLY
Qty: 45 TABLET | Refills: 1 | Status: SHIPPED | OUTPATIENT
Start: 2023-03-31 | End: 2023-04-24 | Stop reason: SDUPTHER

## 2023-03-31 RX ORDER — LEVONORGESTREL 19.5 MG/1
19.5 INTRAUTERINE DEVICE INTRAUTERINE SEE ADMIN INSTRUCTIONS
COMMUNITY
Start: 2021-03-16

## 2023-03-31 RX ORDER — HYDROXYZINE HYDROCHLORIDE 50 MG/1
75 TABLET, FILM COATED ORAL DAILY PRN
COMMUNITY
Start: 2022-12-27 | End: 2023-03-31 | Stop reason: SDUPTHER

## 2023-03-31 NOTE — PROGRESS NOTES
Patient ID: [id number]     Chief Complaint: Gastroesophageal Reflux      HPI:     Anahi Reza is a 27 y.o. female here today for a complaints of acid reflux that has been going on for the last 7 or 8 months.  She wakes up in the night coughing and choking with a burning sensation in her throat.  She has taken Tums but this does not seem to help.  Initially she was having reflux symptoms 2 to 3 times a week but states that recently it has been 2 or 3 times a month.  No over-the-counter medication has helped.  She reports having chest completed last year including an ultrasound of the abdomen and a swallow study but did not receive the results.  She states that the symptoms all started about 2 months after her hernia repair.    09/30/2022  US abdomen limited:  No significant abnormalities  US pelvis:  No significant abnormalities.    10/24/23  NM gastric emptying study: Abnormal gastric emptying scan as described above.  Suspected changes related to gastroparesis versus less likely a partial mechanical obstruction.    It appears she was referred to Gastroenterology and she does endorse this.  She states that [place] was too far to travel and did not attend the appointment. Nearly all her her symptoms of reflux occur at night.       Past Medical History:  has a past medical history of Anxiety, Depression, and Prediabetes.    Surgical History:  has a past surgical history that includes Robot-assisted laparoscopic repair of umbilical hernia (N/A, 6/30/2022).    Family History: family history includes Anxiety disorder in her mother; Depression in her mother; No Known Problems in her brother, cousin, father, maternal aunt, maternal grandfather, maternal grandmother, maternal uncle, paternal aunt, paternal grandfather, paternal grandmother, paternal uncle, sister, and another family member.    Social History:  reports that she has never smoked. She has never used smokeless tobacco. She reports current alcohol  "use. She reports that she does not use drugs.    Current Outpatient Medications   Medication Instructions    clonazePAM (KLONOPIN) 0.5 mg, Oral, 2 times daily PRN    hydrOXYzine (ATARAX) 75 mg, Oral, Daily PRN    ibuprofen (ADVIL,MOTRIN) 800 mg, Oral, Every 6 hours PRN    KYLEENA 19.5 mg, Intrauterine, See admin instructions, Every 5 years    sertraline (ZOLOFT) 150 mg, Oral, Nightly    valACYclovir (VALTREX) 1,000 mg, Oral, 2 times daily PRN       Patient has No Known Allergies.     Patient Care Team:  ONUR Thapa as PCP - General (Family Medicine)  KEREN Escalante as Nurse Practitioner (Psychiatry)  Tisha Ramirez NP as Nurse Practitioner (Obstetrics and Gynecology)  Roger Valderrama       Subjective:     Review of Systems    See HPI     Objective:     Visit Vitals  /80   Pulse 90   Temp 98.1 °F (36.7 °C) (Temporal)   Ht 4' 10" (1.473 m)   Wt 73.4 kg (161 lb 12.8 oz)   SpO2 98%   BMI 33.82 kg/m²       Physical Exam  Constitutional:       Appearance: She is overweight.   HENT:      Head: Normocephalic and atraumatic.      Mouth/Throat:      Mouth: Mucous membranes are moist.   Cardiovascular:      Rate and Rhythm: Normal rate and regular rhythm.   Pulmonary:      Breath sounds: Normal breath sounds.   Abdominal:      General: Bowel sounds are normal.      Palpations: Abdomen is soft.   Musculoskeletal:      Right lower leg: No edema.      Left lower leg: No edema.   Skin:     General: Skin is warm and dry.   Neurological:      General: No focal deficit present.      Mental Status: She is alert. Mental status is at baseline.   Psychiatric:         Mood and Affect: Mood normal.         Behavior: Behavior normal.         Thought Content: Thought content normal.         Judgment: Judgment normal.       Assessment:       ICD-10-CM ICD-9-CM   1. Gastroesophageal reflux disease, unspecified whether esophagitis present  K21.9 530.81        Plan:     She was instructed to contact her " insurance provider for a list of gastroenterologist who accept her insurance and send us this list in a [facility] message.  We will then be able to refer her to a new gastroenterologist, one that is not 3 hours away.  She was also encouraged to contact her insurance for transportation to these appointments if needed.   Advised to eat very early supper and not to lie down for 3-4 hours after eating.    Follow up if symptoms worsen or fail to improve. In addition to their scheduled follow up, the patient has also been instructed to follow up on as needed basis.     Future Appointments   Date Time Provider Department Center   4/24/2023  4:00 PM Bahman Zabala, KEREN WALTRES  [facility]   5/3/2023  2:00 PM [facility]   6/14/2023  8:00 AM FABRICE Gannon OB        ONUR Salinas

## 2023-03-31 NOTE — PROGRESS NOTES
"PSYCHIATRIC FOLLOW-UP VISIT NOTE    Chief Complaint   Patient presents with    Anxiety     "I do not feel my anxiety med is working and would like a change."         History of Present Illness  27 y.o. year old Black or  female with hx of PTSD, MDD, DALE, and insomnia seen today for follow-up appointment and medication management.  Reports that since her last visit her depression has been well controlled with current medication regimen.  She is not been isolative, withdrawn, feeling hopeless or helpless, not having any thoughts of self-harm or depressed mood.  She is experiencing persistent anxiety that occurs at random times and with no identifiable trigger.   She is requesting to stop her BuSpar and switch to another medication to manage has breakthrough symptoms.  She is sleeping well most nights of the week and feels rested in the morning.  Denies any side effects from current medication regimen she is functioning well at work and at home aside from her persistent anxiety.  After discussing treatment options we agreed to stop her BuSpar and increase Zoloft to 150 mg daily.  We will likely increase to 200 mg at some point.  We will also provide short-term prescription of clonazepam as needed for breakthrough anxiety.  May add propranolol in the future for those breakthrough symptoms. Patient denies SI/HI. Denies hallucinations and does not appear to be responding to internal stimuli or be internally preoccupied. No manic symptoms noted.       Objective:     Vitals:  Vitals:    03/31/23 0729   BP: 110/70   BP Location: Left arm   Patient Position: Sitting   Pulse: 87   Temp: 97.5 °F (36.4 °C)   TempSrc: Temporal   SpO2: 97%   Weight: 72.1 kg (159 lb)   Height: 4' 10" (1.473 m)       Wt Readings from Last 3 Encounters:   03/31/23 0729 72.1 kg (159 lb)   06/27/22 0804 65.3 kg (144 lb)   01/17/22 1437 63.7 kg (140 lb 6.9 oz)   12/20/21 1600 64.2 kg (141 lb 8.6 oz)   11/09/21 1019 63.8 kg (140 lb 10.5 " oz)   10/08/21 0838 65.2 kg (143 lb 11.8 oz)   09/16/21 0903 66.2 kg (145 lb 15.1 oz)   09/13/21 0847 65.6 kg (144 lb 10 oz)   08/16/21 1049 65.5 kg (144 lb 6.4 oz)   06/02/21 0823 64 kg (141 lb 1.5 oz)   05/27/21 0946 64.2 kg (141 lb 8.6 oz)   04/29/21 0827 62.8 kg (138 lb 7.2 oz)   03/29/21 1426 63.4 kg (139 lb 12.4 oz)   03/09/21 0901 62.1 kg (136 lb 14.5 oz)   02/25/21 1047 61.1 kg (134 lb 11.2 oz)         Medication:    Current Outpatient Medications:     ibuprofen (ADVIL,MOTRIN) 800 MG tablet, Take 800 mg by mouth every 6 (six) hours as needed., Disp: , Rfl:     levonorgestreL (KYLEENA) 17.5 mcg/24 hrs (5 yrs) 19.5 mg IUD, 19.5 mg by Intrauterine route As instructed. Every 5 years, Disp: , Rfl:     valACYclovir (VALTREX) 1000 MG tablet, Take 1,000 mg by mouth 2 (two) times daily as needed., Disp: , Rfl:     clonazePAM (KLONOPIN) 0.5 MG tablet, Take 1 tablet (0.5 mg total) by mouth 2 (two) times daily as needed for Anxiety., Disp: 30 tablet, Rfl: 0    hydrOXYzine (ATARAX) 50 MG tablet, Take 1.5 tablets (75 mg total) by mouth daily as needed for Anxiety., Disp: 45 tablet, Rfl: 1    sertraline (ZOLOFT) 100 MG tablet, Take 1.5 tablets (150 mg total) by mouth every evening., Disp: 45 tablet, Rfl: 1  No current facility-administered medications for this visit.    Facility-Administered Medications Ordered in Other Visits:     lactated ringers infusion, , Intravenous, Continuous, Fernando Moncada DO, Last Rate: 10 mL/hr at 06/30/22 1252, New Bag at 06/30/22 1252    sodium chloride 0.9% flush 10 mL, 10 mL, Intravenous, PRN, Fernando E Bamelvaoin, DO    sodium chloride 0.9% flush 10 mL, 10 mL, Intravenous, PRN, Fernando E Baudoin, DO       Significant Labs: - none at this time    Significant Imaging: - none at this time    Physical Exam  Vitals and nursing note reviewed.   Constitutional:       General: She is awake.      Appearance: Normal appearance.   Musculoskeletal:      Comments: Full ROM   Neurological:      Mental  "Status: She is alert.   Psychiatric:         Attention and Perception: Attention and perception normal. She does not perceive auditory or visual hallucinations.         Mood and Affect: Affect normal.         Speech: Speech normal.         Behavior: Behavior is cooperative.         Thought Content: Thought content does not include homicidal or suicidal ideation.         Cognition and Memory: Cognition and memory normal.        Review of Systems     Mental Status Exam:  Presentation:  - Appearance: 27 y.o. year old Black or  female, appears stated age, appears Casually dressed and Well groomed  - Motility: Erect when standing, Steady gait, No EPS or Tremors, No psychomotor agitation or retardation appreciated  - Behavior: calm, cooperative, good eye contact  Speech:  - Character/Organization: spontaneous, fluent, normal volume, normal rate, normal rhythm  Emotional State:  - Mood: "anxious"   - Affect: congruent and anxious  Thought:  - Process: logical, linear, organized , goal-directed  - Preoccupations: no ruminations, rituals, or phobias appreciated  - Delusions: no persecutory, paranoid, or grandiose delusions appreciated  - Perception: denies AVH, not actively responding to internal stimuli  - SI/HI: denies/denies  Sensorium & Intellect:  - Sensorium: AAOx4  - Memory: intact to recent and remote events  - Attention/Concentration: good/good  - Insight/Judgement: good/good    Gait: normal swing and stance  MSK:no rigidity appreciated    All other systems without acute issues unless noted in HPI      Assessment/Plan      ICD-10-CM ICD-9-CM    1. PTSD (post-traumatic stress disorder)  F43.10 309.81 sertraline (ZOLOFT) 100 MG tablet      2. Recurrent major depressive disorder, remission status unspecified  F33.9 296.30 sertraline (ZOLOFT) 100 MG tablet      3. Generalized anxiety disorder  F41.1 300.02 hydrOXYzine (ATARAX) 50 MG tablet      clonazePAM (KLONOPIN) 0.5 MG tablet      4. Insomnia, " unspecified type  G47.00 780.52 hydrOXYzine (ATARAX) 50 MG tablet          Increase Zoloft to 150 mg daily     Start buspirone due to inefficacy.  Start clonazepam 0.5 mg twice daily as needed for severe anxiety.  Short-term prescription while increase Zoloft dose becomes effective    Continue other medications without change    Reviewed non-pharmacologic coping skills to decrease anxiety, such as deep breathing, journaling, exercise, recognizing triggers, meditation, healthy diet and exercise, routine sleep schedule, negative thought recognition, time for hobbies/interests, relaxation techniques, avoidance of substance abuse, limiting caffeine, benefits of counseling, and biofeedback. Patient verbalized understanding.     checked. Results as expected. No suspected diversion or abuse of controlled substances.    Potential side effects and risks vs benefits of current treatment plan reviewed with patient. Applicable black box warnings reviewed. Encouraged patient not to alter dosages or abruptly discontinue medications without contacting prescriber first, due to risk of worsening symptoms and decompensation of mental status. Warned of risks associated with herbal remedies and supplements while taking psychotropic medications and of the need to consult prescriber prior to adding any of these to current regimen. Patient should abstain from abuse of alcohol, prescription medications, and illicit drugs. Reviewed when to contact clinic and/or seek emergent care, such as but not limited to, onset/worsening SI/HI, hallucinations, delusions, manic symptoms. Pt verbalized understanding and agreement of these warnings/recommendations and verbally consented to treatment plan.      Follow up for Medication Management.    Bahman Zabala, PMHIRAMP

## 2023-04-24 ENCOUNTER — OFFICE VISIT (OUTPATIENT)
Dept: BEHAVIORAL HEALTH | Facility: CLINIC | Age: 28
End: 2023-04-24
Payer: MEDICAID

## 2023-04-24 VITALS
DIASTOLIC BLOOD PRESSURE: 74 MMHG | HEART RATE: 89 BPM | OXYGEN SATURATION: 98 % | BODY MASS INDEX: 33.58 KG/M2 | TEMPERATURE: 98 F | WEIGHT: 160 LBS | SYSTOLIC BLOOD PRESSURE: 104 MMHG | HEIGHT: 58 IN

## 2023-04-24 DIAGNOSIS — F41.1 GENERALIZED ANXIETY DISORDER: ICD-10-CM

## 2023-04-24 DIAGNOSIS — F33.9 RECURRENT MAJOR DEPRESSIVE DISORDER, REMISSION STATUS UNSPECIFIED: ICD-10-CM

## 2023-04-24 DIAGNOSIS — F43.10 PTSD (POST-TRAUMATIC STRESS DISORDER): Primary | ICD-10-CM

## 2023-04-24 DIAGNOSIS — G47.00 INSOMNIA, UNSPECIFIED TYPE: ICD-10-CM

## 2023-04-24 PROBLEM — F43.12 CHRONIC POST-TRAUMATIC STRESS DISORDER (PTSD): Status: RESOLVED | Noted: 2023-03-31 | Resolved: 2023-04-24

## 2023-04-24 PROBLEM — F33.1 MODERATE EPISODE OF RECURRENT MAJOR DEPRESSIVE DISORDER: Status: RESOLVED | Noted: 2023-03-31 | Resolved: 2023-04-24

## 2023-04-24 PROCEDURE — 1159F MED LIST DOCD IN RCRD: CPT | Mod: CPTII,,, | Performed by: NURSE PRACTITIONER

## 2023-04-24 PROCEDURE — 3008F PR BODY MASS INDEX (BMI) DOCUMENTED: ICD-10-PCS | Mod: CPTII,,, | Performed by: NURSE PRACTITIONER

## 2023-04-24 PROCEDURE — 99214 PR OFFICE/OUTPT VISIT, EST, LEVL IV, 30-39 MIN: ICD-10-PCS | Mod: SA,HB,, | Performed by: NURSE PRACTITIONER

## 2023-04-24 PROCEDURE — 3074F PR MOST RECENT SYSTOLIC BLOOD PRESSURE < 130 MM HG: ICD-10-PCS | Mod: CPTII,,, | Performed by: NURSE PRACTITIONER

## 2023-04-24 PROCEDURE — 1160F PR REVIEW ALL MEDS BY PRESCRIBER/CLIN PHARMACIST DOCUMENTED: ICD-10-PCS | Mod: CPTII,,, | Performed by: NURSE PRACTITIONER

## 2023-04-24 PROCEDURE — 3008F BODY MASS INDEX DOCD: CPT | Mod: CPTII,,, | Performed by: NURSE PRACTITIONER

## 2023-04-24 PROCEDURE — 3078F DIAST BP <80 MM HG: CPT | Mod: CPTII,,, | Performed by: NURSE PRACTITIONER

## 2023-04-24 PROCEDURE — 99214 OFFICE O/P EST MOD 30 MIN: CPT | Mod: SA,HB,, | Performed by: NURSE PRACTITIONER

## 2023-04-24 PROCEDURE — 3074F SYST BP LT 130 MM HG: CPT | Mod: CPTII,,, | Performed by: NURSE PRACTITIONER

## 2023-04-24 PROCEDURE — 1160F RVW MEDS BY RX/DR IN RCRD: CPT | Mod: CPTII,,, | Performed by: NURSE PRACTITIONER

## 2023-04-24 PROCEDURE — 3078F PR MOST RECENT DIASTOLIC BLOOD PRESSURE < 80 MM HG: ICD-10-PCS | Mod: CPTII,,, | Performed by: NURSE PRACTITIONER

## 2023-04-24 PROCEDURE — 1159F PR MEDICATION LIST DOCUMENTED IN MEDICAL RECORD: ICD-10-PCS | Mod: CPTII,,, | Performed by: NURSE PRACTITIONER

## 2023-04-24 RX ORDER — SERTRALINE HYDROCHLORIDE 100 MG/1
150 TABLET, FILM COATED ORAL NIGHTLY
Qty: 45 TABLET | Refills: 1 | Status: SHIPPED | OUTPATIENT
Start: 2023-04-24 | End: 2023-05-23 | Stop reason: SDUPTHER

## 2023-04-24 NOTE — PROGRESS NOTES
"PSYCHIATRIC FOLLOW-UP VISIT NOTE    Chief Complaint   Patient presents with    Anxiety     "I haven't noticed a significant change with the increase in zoloft and have only needed the clonazepam 4 times since last visit."         History of Present Illness  27 y.o. year old Black or  female with hx of PTSD, MDD, ketan, and insomnia seen today for follow-up appointment and medication management..  Reports positive response to increased dose of Zoloft including decreased depression and anxiety since last visit.  She has been sleeping well and feels rested in the morning.  Denies any recent nightmares or flashbacks.  She also took a vacation to [place] with some of her friends and reports having a good vacation and enjoying the break from work.  She is able to cope more effectively with her anxiety and has only required her p.r.n. Klonopin 3-4 times since our last visit.  Denies any side effects from current medications.  She does express feelings of loneliness in the evenings.  Feels that all she does regular routine is go to work and return home.  Does not have many friends in the [facility].  We did spend time discussing some strategies to help her increase her socialization and improve her support system by finding others with similar interests she enjoys. Patient denies SI/HI. Denies hallucinations and does not appear to be responding to internal stimuli or be internally preoccupied. No manic symptoms noted.           Objective:     Vitals:  Vitals:    04/24/23 1549   BP: 104/74   BP Location: Left arm   Patient Position: Sitting   Pulse: 89   Temp: 98.1 °F (36.7 °C)   TempSrc: Temporal   SpO2: 98%   Weight: 72.6 kg (160 lb)   Height: 4' 10" (1.473 m)       Wt Readings from Last 3 Encounters:   04/24/23 1549 72.6 kg (160 lb)   03/31/23 0928 73.4 kg (161 lb 12.8 oz)   03/31/23 0729 72.1 kg (159 lb)         Medication:    Current Outpatient Medications:     hydrOXYzine (ATARAX) 50 MG tablet, Take 1.5 " tablets (75 mg total) by mouth daily as needed for Anxiety., Disp: 45 tablet, Rfl: 1    ibuprofen (ADVIL,MOTRIN) 800 MG tablet, Take 800 mg by mouth every 6 (six) hours as needed., Disp: , Rfl:     levonorgestreL (KYLEENA) 17.5 mcg/24 hrs (5 yrs) 19.5 mg IUD, 19.5 mg by Intrauterine route As instructed. Every 5 years, Disp: , Rfl:     valACYclovir (VALTREX) 1000 MG tablet, Take 1,000 mg by mouth 2 (two) times daily as needed., Disp: , Rfl:     clonazePAM (KLONOPIN) 0.5 MG tablet, Take 1 tablet (0.5 mg total) by mouth 2 (two) times daily as needed for Anxiety., Disp: 30 tablet, Rfl: 0    sertraline (ZOLOFT) 100 MG tablet, Take 1.5 tablets (150 mg total) by mouth every evening., Disp: 45 tablet, Rfl: 1  No current facility-administered medications for this visit.    Facility-Administered Medications Ordered in Other Visits:     lactated ringers infusion, , Intravenous, Continuous, Fernando Moncada, DO, Last Rate: 10 mL/hr at 06/30/22 1252, New Bag at 06/30/22 1252    sodium chloride 0.9% flush 10 mL, 10 mL, Intravenous, PRN, Fernando E Baudoin, DO    sodium chloride 0.9% flush 10 mL, 10 mL, Intravenous, PRN, Fernando E Baudoin, DO       Significant Labs: - none at this time    Significant Imaging: - none at this time    Physical Exam  Vitals and nursing note reviewed.   Constitutional:       General: She is awake.      Appearance: Normal appearance.   Musculoskeletal:      Comments: Full ROM   Neurological:      Mental Status: She is alert.   Psychiatric:         Attention and Perception: Attention and perception normal. She does not perceive auditory or visual hallucinations.         Mood and Affect: Affect normal.         Speech: Speech normal.         Behavior: Behavior is cooperative.         Thought Content: Thought content does not include homicidal or suicidal ideation.         Cognition and Memory: Cognition and memory normal.        Review of Systems     Mental Status Exam:  Presentation:  - Appearance: 27 y.o. year  "old Black or  female, appears stated age, appears Casually dressed and Well groomed  - Motility: Erect when standing, Steady gait, No EPS or Tremors, No psychomotor agitation or retardation appreciated  - Behavior: calm, cooperative, maintains eye contact  Speech:  - Character/Organization: spontaneous, fluent, normal volume, normal rate, normal rhythm  Emotional State:  - Mood: "pleasant, lonely sometimes"   - Affect: congruent and appropriate  Thought:  - Process: logical, linear, organized , goal-directed  - Preoccupations: no ruminations, rituals, or phobias appreciated  - Delusions: no persecutory, paranoid, or grandiose delusions appreciated  - Perception: denies AVH, not actively responding to internal stimuli  - SI/HI: denies/denies  Sensorium & Intellect:  - Sensorium: AAOx4  - Memory: intact to recent and remote events  - Attention/Concentration: good/good  - Insight/Judgement: good/good    Gait: normal swing and stance  MSK:no rigidity appreciated    All other systems without acute issues unless noted in HPI      Assessment/Plan      ICD-10-CM ICD-9-CM    1. PTSD (post-traumatic stress disorder)  F43.10 309.81 sertraline (ZOLOFT) 100 MG tablet      2. Recurrent major depressive disorder, remission status unspecified  F33.9 296.30 sertraline (ZOLOFT) 100 MG tablet      3. Generalized anxiety disorder  F41.1 300.02       4. Insomnia, unspecified type  G47.00 780.52          Continue current medications without change    Potential side effects and risks vs benefits of current treatment plan reviewed with patient. Applicable black box warnings reviewed. Encouraged patient not to alter dosages or abruptly discontinue medications without contacting prescriber first, due to risk of worsening symptoms and decompensation of mental status. Warned of risks associated with herbal remedies and supplements while taking psychotropic medications and of the need to consult prescriber prior to adding any of " these to current regimen. Patient should abstain from abuse of alcohol, prescription medications, and illicit drugs. Reviewed when to contact clinic and/or seek emergent care, such as but not limited to, onset/worsening SI/HI, hallucinations, delusions, manic symptoms. Pt verbalized understanding and agreement of these warnings/recommendations and verbally consented to treatment plan.     checked. Results as expected. No suspected diversion or abuse of controlled substances.      Follow up in about 4 weeks (around 5/22/2023) for Medication Management.    Bahman Zabala, RONP

## 2023-04-26 DIAGNOSIS — Z00.00 WELLNESS EXAMINATION: Primary | ICD-10-CM

## 2023-05-12 ENCOUNTER — PATIENT MESSAGE (OUTPATIENT)
Dept: BEHAVIORAL HEALTH | Facility: CLINIC | Age: 28
End: 2023-05-12
Payer: MEDICAID

## 2023-05-15 ENCOUNTER — TELEPHONE (OUTPATIENT)
Dept: BEHAVIORAL HEALTH | Facility: CLINIC | Age: 28
End: 2023-05-15
Payer: MEDICAID

## 2023-05-15 NOTE — TELEPHONE ENCOUNTER
Pet type: Italian bulldog  Sex: Male  Name: Ice    Informed pt she will receive a call when letter is prepared-timeframe is unavailable, she had no complaints. She has noted since acquiring the puppy her anxiety has lowered significantly.

## 2023-05-22 ENCOUNTER — PATIENT MESSAGE (OUTPATIENT)
Dept: BEHAVIORAL HEALTH | Facility: CLINIC | Age: 28
End: 2023-05-22
Payer: MEDICAID

## 2023-05-23 ENCOUNTER — OFFICE VISIT (OUTPATIENT)
Dept: BEHAVIORAL HEALTH | Facility: CLINIC | Age: 28
End: 2023-05-23
Payer: MEDICAID

## 2023-05-23 VITALS
HEART RATE: 87 BPM | DIASTOLIC BLOOD PRESSURE: 68 MMHG | TEMPERATURE: 98 F | WEIGHT: 158 LBS | SYSTOLIC BLOOD PRESSURE: 108 MMHG | HEIGHT: 58 IN | BODY MASS INDEX: 33.17 KG/M2 | OXYGEN SATURATION: 98 %

## 2023-05-23 DIAGNOSIS — F33.9 RECURRENT MAJOR DEPRESSIVE DISORDER, REMISSION STATUS UNSPECIFIED: ICD-10-CM

## 2023-05-23 DIAGNOSIS — G47.00 INSOMNIA, UNSPECIFIED TYPE: ICD-10-CM

## 2023-05-23 DIAGNOSIS — F41.1 GENERALIZED ANXIETY DISORDER: ICD-10-CM

## 2023-05-23 DIAGNOSIS — F43.10 PTSD (POST-TRAUMATIC STRESS DISORDER): Primary | ICD-10-CM

## 2023-05-23 PROCEDURE — 1159F PR MEDICATION LIST DOCUMENTED IN MEDICAL RECORD: ICD-10-PCS | Mod: CPTII,,, | Performed by: NURSE PRACTITIONER

## 2023-05-23 PROCEDURE — 3008F BODY MASS INDEX DOCD: CPT | Mod: CPTII,,, | Performed by: NURSE PRACTITIONER

## 2023-05-23 PROCEDURE — 1160F PR REVIEW ALL MEDS BY PRESCRIBER/CLIN PHARMACIST DOCUMENTED: ICD-10-PCS | Mod: CPTII,,, | Performed by: NURSE PRACTITIONER

## 2023-05-23 PROCEDURE — 1160F RVW MEDS BY RX/DR IN RCRD: CPT | Mod: CPTII,,, | Performed by: NURSE PRACTITIONER

## 2023-05-23 PROCEDURE — 3078F PR MOST RECENT DIASTOLIC BLOOD PRESSURE < 80 MM HG: ICD-10-PCS | Mod: CPTII,,, | Performed by: NURSE PRACTITIONER

## 2023-05-23 PROCEDURE — 99214 PR OFFICE/OUTPT VISIT, EST, LEVL IV, 30-39 MIN: ICD-10-PCS | Mod: SA,HB,, | Performed by: NURSE PRACTITIONER

## 2023-05-23 PROCEDURE — 99214 OFFICE O/P EST MOD 30 MIN: CPT | Mod: SA,HB,, | Performed by: NURSE PRACTITIONER

## 2023-05-23 PROCEDURE — 1159F MED LIST DOCD IN RCRD: CPT | Mod: CPTII,,, | Performed by: NURSE PRACTITIONER

## 2023-05-23 PROCEDURE — 3074F PR MOST RECENT SYSTOLIC BLOOD PRESSURE < 130 MM HG: ICD-10-PCS | Mod: CPTII,,, | Performed by: NURSE PRACTITIONER

## 2023-05-23 PROCEDURE — 3078F DIAST BP <80 MM HG: CPT | Mod: CPTII,,, | Performed by: NURSE PRACTITIONER

## 2023-05-23 PROCEDURE — 3074F SYST BP LT 130 MM HG: CPT | Mod: CPTII,,, | Performed by: NURSE PRACTITIONER

## 2023-05-23 PROCEDURE — 3008F PR BODY MASS INDEX (BMI) DOCUMENTED: ICD-10-PCS | Mod: CPTII,,, | Performed by: NURSE PRACTITIONER

## 2023-05-23 RX ORDER — CLONAZEPAM 0.5 MG/1
0.5 TABLET ORAL 2 TIMES DAILY PRN
Qty: 30 TABLET | Refills: 0 | Status: SHIPPED | OUTPATIENT
Start: 2023-05-23 | End: 2023-07-26 | Stop reason: SDUPTHER

## 2023-05-23 RX ORDER — SERTRALINE HYDROCHLORIDE 100 MG/1
150 TABLET, FILM COATED ORAL NIGHTLY
Qty: 45 TABLET | Refills: 1 | Status: SHIPPED | OUTPATIENT
Start: 2023-05-23 | End: 2023-07-26 | Stop reason: SDUPTHER

## 2023-05-23 NOTE — PROGRESS NOTES
"PSYCHIATRIC FOLLOW-UP VISIT NOTE    Chief Complaint   Patient presents with    Mood     "I have no complaints."         History of Present Illness  27 y.o. year old Black or  female with hx of D, MDD, ketan, and insomnia seen today for follow-up appointment and medication management.  Reports she is been doing very well since last visit.  Denies any recent depression.  No appetite changes, anhedonia, low motivation, isolative behaviors, feelings of guilt or shame, sleep disturbances, or feelings of hopelessness and helplessness.  Anxiety has also decreased.  She is been affected from having her emotional support animal at her home.  No recent panic attacks or moderate to severe anxiety symptoms.  Reports some mild symptoms do arise she is able to cope with them more effectively.  She also reports not feeling as lonely, which is something we discussed at length during her last visit.  She is been more opportunities to socialize and has been getting out of town and traveling more places with her son where she was also found reporting and beneficial.  Denies any side effects from current medication regimen.  She is sleeping well and feels rested in the morning.  Attending to responsibilities at work and at home without difficulty.  No recent nightmares or flashbacks. Patient denies SI/HI. Denies hallucinations and does not appear to be responding to internal stimuli or be internally preoccupied. No manic symptoms noted.       Objective:     Vitals:  Vitals:    05/23/23 1557   BP: 108/68   BP Location: Left arm   Patient Position: Sitting   Pulse: 87   Temp: 97.9 °F (36.6 °C)   TempSrc: Temporal   SpO2: 98%   Weight: 71.7 kg (158 lb)   Height: 4' 10" (1.473 m)       Wt Readings from Last 3 Encounters:   05/23/23 1557 71.7 kg (158 lb)   04/24/23 1549 72.6 kg (160 lb)   03/31/23 0928 73.4 kg (161 lb 12.8 oz)         Medication:    Current Outpatient Medications:     hydrOXYzine (ATARAX) 50 MG tablet, Take " 1.5 tablets (75 mg total) by mouth daily as needed for Anxiety., Disp: 45 tablet, Rfl: 1    ibuprofen (ADVIL,MOTRIN) 800 MG tablet, Take 800 mg by mouth every 6 (six) hours as needed., Disp: , Rfl:     levonorgestreL (KYLEENA) 17.5 mcg/24 hrs (5 yrs) 19.5 mg IUD, 19.5 mg by Intrauterine route As instructed. Every 5 years, Disp: , Rfl:     valACYclovir (VALTREX) 1000 MG tablet, Take 1,000 mg by mouth 2 (two) times daily as needed., Disp: , Rfl:     clonazePAM (KLONOPIN) 0.5 MG tablet, Take 1 tablet (0.5 mg total) by mouth 2 (two) times daily as needed for Anxiety., Disp: 30 tablet, Rfl: 0    sertraline (ZOLOFT) 100 MG tablet, Take 1.5 tablets (150 mg total) by mouth every evening., Disp: 45 tablet, Rfl: 1  No current facility-administered medications for this visit.    Facility-Administered Medications Ordered in Other Visits:     lactated ringers infusion, , Intravenous, Continuous, Fernando Moncada, DO, Last Rate: 10 mL/hr at 06/30/22 1252, New Bag at 06/30/22 1252    sodium chloride 0.9% flush 10 mL, 10 mL, Intravenous, PRN, Fernando E Baudoin, DO    sodium chloride 0.9% flush 10 mL, 10 mL, Intravenous, PRN, Fernando E Baudoin, DO       Significant Labs: - none at this time    Significant Imaging: - none at this time    Physical Exam  Vitals and nursing note reviewed.   Constitutional:       General: She is awake.      Appearance: Normal appearance.   Musculoskeletal:      Comments: Full ROM   Neurological:      Mental Status: She is alert.   Psychiatric:         Attention and Perception: Attention and perception normal. She does not perceive auditory or visual hallucinations.         Mood and Affect: Affect normal.         Speech: Speech normal.         Behavior: Behavior is cooperative.         Thought Content: Thought content does not include homicidal or suicidal ideation.         Cognition and Memory: Cognition and memory normal.        Review of Systems     Mental Status Exam:  Presentation:  - Appearance: 27 y.o.  "year old Black or  female, appears stated age, appears Casually dressed and Well groomed  - Motility: Erect when standing, Steady gait, No EPS or Tremors, No psychomotor agitation or retardation appreciated  - Behavior: calm, cooperative, good eye contact  Speech:  - Character/Organization: spontaneous, fluent, normal volume, normal rate, normal rhythm  Emotional State:  - Mood: "happy"   - Affect: congruent and appropriate  Thought:  - Process: logical, linear, organized , goal-directed  - Preoccupations: no ruminations, rituals, or phobias appreciated  - Delusions: no persecutory, paranoid, or grandiose delusions appreciated  - Perception: denies AVH, not actively responding to internal stimuli  - SI/HI: denies/denies  Sensorium & Intellect:  - Sensorium: AAOx4  - Memory: intact to recent and remote events  - Attention/Concentration: good/good  - Insight/Judgement: good/good    Gait: normal swing and stance  MSK:no rigidity appreciated    All other systems without acute issues unless noted in HPI      Assessment/Plan      ICD-10-CM ICD-9-CM    1. PTSD (post-traumatic stress disorder)  F43.10 309.81 sertraline (ZOLOFT) 100 MG tablet      2. Recurrent major depressive disorder, remission status unspecified  F33.9 296.30 sertraline (ZOLOFT) 100 MG tablet      3. Generalized anxiety disorder  F41.1 300.02 clonazePAM (KLONOPIN) 0.5 MG tablet      4. Insomnia, unspecified type  G47.00 780.52          Continue current medications without change    Potential side effects and risks vs benefits of current treatment plan reviewed with patient. Applicable black box warnings reviewed. Encouraged patient not to alter dosages or abruptly discontinue medications without contacting prescriber first, due to risk of worsening symptoms and decompensation of mental status. Warned of risks associated with herbal remedies and supplements while taking psychotropic medications and of the need to consult prescriber prior to " adding any of these to current regimen. Patient should abstain from abuse of alcohol, prescription medications not prescribed to her, and illicit drugs. Reviewed when to contact clinic and/or seek emergent care, such as but not limited to, onset/worsening SI/HI, hallucinations, delusions, manic symptoms. Pt verbalized understanding and agreement of these warnings/recommendations and verbally consented to treatment plan.     checked. Results as expected. No suspected diversion or abuse of controlled substances.      Follow up in about 2 months (around 7/23/2023) for Medication Management.    Bahman Zabala, RONP
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
## 2023-06-14 ENCOUNTER — OFFICE VISIT (OUTPATIENT)
Dept: OBSTETRICS AND GYNECOLOGY | Facility: CLINIC | Age: 28
End: 2023-06-14
Payer: MEDICAID

## 2023-06-14 VITALS
DIASTOLIC BLOOD PRESSURE: 78 MMHG | BODY MASS INDEX: 33.14 KG/M2 | SYSTOLIC BLOOD PRESSURE: 122 MMHG | HEIGHT: 58 IN | WEIGHT: 157.88 LBS

## 2023-06-14 DIAGNOSIS — N89.8 VAGINAL ODOR: ICD-10-CM

## 2023-06-14 DIAGNOSIS — Z11.3 SCREEN FOR SEXUALLY TRANSMITTED DISEASES: ICD-10-CM

## 2023-06-14 DIAGNOSIS — Z12.4 CERVICAL CANCER SCREENING: ICD-10-CM

## 2023-06-14 DIAGNOSIS — Z30.431 ENCOUNTER FOR ROUTINE CHECKING OF INTRAUTERINE CONTRACEPTIVE DEVICE (IUD): ICD-10-CM

## 2023-06-14 DIAGNOSIS — B00.9 HSV (HERPES SIMPLEX VIRUS) INFECTION: ICD-10-CM

## 2023-06-14 DIAGNOSIS — Z01.411 ABNORMAL GYNECOLOGICAL EXAMINATION: Primary | ICD-10-CM

## 2023-06-14 DIAGNOSIS — Z23 NEED FOR HPV VACCINATION: ICD-10-CM

## 2023-06-14 PROCEDURE — 1159F MED LIST DOCD IN RCRD: CPT | Mod: CPTII,,, | Performed by: NURSE PRACTITIONER

## 2023-06-14 PROCEDURE — 3078F DIAST BP <80 MM HG: CPT | Mod: CPTII,,, | Performed by: NURSE PRACTITIONER

## 2023-06-14 PROCEDURE — 1160F PR REVIEW ALL MEDS BY PRESCRIBER/CLIN PHARMACIST DOCUMENTED: ICD-10-PCS | Mod: CPTII,,, | Performed by: NURSE PRACTITIONER

## 2023-06-14 PROCEDURE — 1159F PR MEDICATION LIST DOCUMENTED IN MEDICAL RECORD: ICD-10-PCS | Mod: CPTII,,, | Performed by: NURSE PRACTITIONER

## 2023-06-14 PROCEDURE — 1160F RVW MEDS BY RX/DR IN RCRD: CPT | Mod: CPTII,,, | Performed by: NURSE PRACTITIONER

## 2023-06-14 PROCEDURE — 3074F SYST BP LT 130 MM HG: CPT | Mod: CPTII,,, | Performed by: NURSE PRACTITIONER

## 2023-06-14 PROCEDURE — 3078F PR MOST RECENT DIASTOLIC BLOOD PRESSURE < 80 MM HG: ICD-10-PCS | Mod: CPTII,,, | Performed by: NURSE PRACTITIONER

## 2023-06-14 PROCEDURE — 3074F PR MOST RECENT SYSTOLIC BLOOD PRESSURE < 130 MM HG: ICD-10-PCS | Mod: CPTII,,, | Performed by: NURSE PRACTITIONER

## 2023-06-14 PROCEDURE — 99395 PR PREVENTIVE VISIT,EST,18-39: ICD-10-PCS | Mod: ,,, | Performed by: NURSE PRACTITIONER

## 2023-06-14 PROCEDURE — 3008F PR BODY MASS INDEX (BMI) DOCUMENTED: ICD-10-PCS | Mod: CPTII,,, | Performed by: NURSE PRACTITIONER

## 2023-06-14 PROCEDURE — 99395 PREV VISIT EST AGE 18-39: CPT | Mod: ,,, | Performed by: NURSE PRACTITIONER

## 2023-06-14 PROCEDURE — 3008F BODY MASS INDEX DOCD: CPT | Mod: CPTII,,, | Performed by: NURSE PRACTITIONER

## 2023-06-14 RX ORDER — METRONIDAZOLE 500 MG/1
500 TABLET ORAL EVERY 12 HOURS
Qty: 14 TABLET | Refills: 0 | Status: SHIPPED | OUTPATIENT
Start: 2023-06-14 | End: 2023-06-21

## 2023-06-14 RX ORDER — VALACYCLOVIR HYDROCHLORIDE 1 G/1
1000 TABLET, FILM COATED ORAL 2 TIMES DAILY PRN
Qty: 6 TABLET | Refills: 6 | Status: SHIPPED | OUTPATIENT
Start: 2023-06-14 | End: 2023-11-19 | Stop reason: SDUPTHER

## 2023-06-16 ENCOUNTER — LAB VISIT (OUTPATIENT)
Dept: LAB | Facility: HOSPITAL | Age: 28
End: 2023-06-16
Attending: NURSE PRACTITIONER
Payer: MEDICAID

## 2023-06-16 DIAGNOSIS — N89.8 VAGINAL ODOR: ICD-10-CM

## 2023-06-16 DIAGNOSIS — Z11.3 SCREEN FOR SEXUALLY TRANSMITTED DISEASES: ICD-10-CM

## 2023-06-16 LAB
HBV SURFACE AG SERPL QL IA: NEGATIVE
HBV SURFACE AG SERPL QL IA: NORMAL
HCV AB SERPL QL IA: NONREACTIVE
HIV 1+2 AB+HIV1 P24 AG SERPL QL IA: NONREACTIVE
T PALLIDUM AB SER QL: NONREACTIVE

## 2023-06-16 PROCEDURE — 86780 TREPONEMA PALLIDUM: CPT

## 2023-06-16 PROCEDURE — 87340 HEPATITIS B SURFACE AG IA: CPT

## 2023-06-16 PROCEDURE — 87389 HIV-1 AG W/HIV-1&-2 AB AG IA: CPT

## 2023-06-16 PROCEDURE — 36415 COLL VENOUS BLD VENIPUNCTURE: CPT

## 2023-06-16 PROCEDURE — 86803 HEPATITIS C AB TEST: CPT

## 2023-06-19 LAB — PSYCHE PATHOLOGY RESULT: NORMAL

## 2023-07-03 ENCOUNTER — PATIENT MESSAGE (OUTPATIENT)
Dept: BEHAVIORAL HEALTH | Facility: CLINIC | Age: 28
End: 2023-07-03
Payer: MEDICAID

## 2023-07-05 NOTE — TELEPHONE ENCOUNTER
Continue medication for now. Monitor diet and exercise as tolerated. We will discuss options at next visit

## 2023-07-07 ENCOUNTER — TELEPHONE (OUTPATIENT)
Dept: OBSTETRICS AND GYNECOLOGY | Facility: CLINIC | Age: 28
End: 2023-07-07
Payer: MEDICAID

## 2023-07-07 DIAGNOSIS — A49.3 MYCOPLASMA INFECTION: Primary | ICD-10-CM

## 2023-07-07 RX ORDER — DOXYCYCLINE 100 MG/1
100 CAPSULE ORAL 2 TIMES DAILY
Qty: 28 CAPSULE | Refills: 0 | Status: SHIPPED | OUTPATIENT
Start: 2023-07-07 | End: 2023-07-21

## 2023-07-07 RX ORDER — FLUCONAZOLE 200 MG/1
TABLET ORAL
Qty: 2 TABLET | Refills: 0 | Status: SHIPPED | OUTPATIENT
Start: 2023-07-07 | End: 2023-08-29

## 2023-07-07 NOTE — TELEPHONE ENCOUNTER
Contacted pt and notified of + results. Pt verbalized understanding of results. States she normally gets yeast infections with antibiotics, requested medication for that as well.   Confirmed with .     Notified pt of need for folow up appt for [facility]. Sent to Liliana for scheduling.    Ph[facility]: [facility]     Rx: Doxy 100mg BID x14 days  Rx: Diflucan 200mg 1po today, repeat in 3 days

## 2023-07-11 ENCOUNTER — PATIENT MESSAGE (OUTPATIENT)
Dept: RESEARCH | Facility: HOSPITAL | Age: 28
End: 2023-07-11
Payer: MEDICAID

## 2023-07-13 ENCOUNTER — OFFICE VISIT (OUTPATIENT)
Dept: FAMILY MEDICINE | Facility: CLINIC | Age: 28
End: 2023-07-13
Payer: MEDICAID

## 2023-07-13 VITALS
WEIGHT: 156 LBS | DIASTOLIC BLOOD PRESSURE: 60 MMHG | BODY MASS INDEX: 32.75 KG/M2 | SYSTOLIC BLOOD PRESSURE: 118 MMHG | OXYGEN SATURATION: 96 % | HEART RATE: 62 BPM | HEIGHT: 58 IN | TEMPERATURE: 96 F

## 2023-07-13 DIAGNOSIS — E04.9 ENLARGED THYROID: ICD-10-CM

## 2023-07-13 DIAGNOSIS — G47.10 DAYTIME HYPERSOMNIA: ICD-10-CM

## 2023-07-13 DIAGNOSIS — E66.9 OBESITY, UNSPECIFIED CLASSIFICATION, UNSPECIFIED OBESITY TYPE, UNSPECIFIED WHETHER SERIOUS COMORBIDITY PRESENT: ICD-10-CM

## 2023-07-13 DIAGNOSIS — Z12.31 SCREENING MAMMOGRAM FOR BREAST CANCER: ICD-10-CM

## 2023-07-13 DIAGNOSIS — Z00.01 ENCOUNTER FOR ROUTINE ADULT HEALTH EXAMINATION WITH ABNORMAL FINDINGS: Primary | ICD-10-CM

## 2023-07-13 DIAGNOSIS — R73.03 PREDIABETES: ICD-10-CM

## 2023-07-13 DIAGNOSIS — G47.33 OSA (OBSTRUCTIVE SLEEP APNEA): ICD-10-CM

## 2023-07-13 LAB
ALBUMIN SERPL-MCNC: 4.6 G/DL (ref 3.4–5)
ALBUMIN/GLOB SERPL: 1.6 RATIO
ALP SERPL-CCNC: 54 UNIT/L (ref 50–144)
ALT SERPL-CCNC: 13 UNIT/L (ref 1–45)
ANION GAP SERPL CALC-SCNC: 5 MEQ/L (ref 2–13)
AST SERPL-CCNC: 21 UNIT/L (ref 14–36)
BASOPHILS # BLD AUTO: 0.02 X10(3)/MCL (ref 0.01–0.08)
BASOPHILS NFR BLD AUTO: 0.3 % (ref 0.1–1.2)
BILIRUBIN DIRECT+TOT PNL SERPL-MCNC: 0.4 MG/DL (ref 0–1)
BUN SERPL-MCNC: 12 MG/DL (ref 7–20)
CALCIUM SERPL-MCNC: 9.7 MG/DL (ref 8.4–10.2)
CHLORIDE SERPL-SCNC: 107 MMOL/L (ref 98–110)
CHOLEST SERPL-MCNC: 167 MG/DL (ref 0–200)
CO2 SERPL-SCNC: 28 MMOL/L (ref 21–32)
CREAT SERPL-MCNC: 0.9 MG/DL (ref 0.66–1.25)
CREAT/UREA NIT SERPL: 13 (ref 12–20)
DEPRECATED CALCIDIOL+CALCIFEROL SERPL-MC: 30.8 NG/ML (ref 30–100)
EOSINOPHIL # BLD AUTO: 0.12 X10(3)/MCL (ref 0.04–0.36)
EOSINOPHIL NFR BLD AUTO: 2.1 % (ref 0.7–7)
ERYTHROCYTE [DISTWIDTH] IN BLOOD BY AUTOMATED COUNT: 13.6 % (ref 11–14.5)
EST. AVERAGE GLUCOSE BLD GHB EST-MCNC: 139.9 MG/DL (ref 70–115)
GFR SERPLBLD CREATININE-BSD FMLA CKD-EPI: 90 MLS/MIN/1.73/M2
GLOBULIN SER-MCNC: 2.8 GM/DL (ref 2–3.9)
GLUCOSE SERPL-MCNC: 98 MG/DL (ref 70–115)
HBA1C MFR BLD: 6.5 % (ref 4–6)
HCT VFR BLD AUTO: 37.5 % (ref 36–48)
HDLC SERPL-MCNC: 40 MG/DL (ref 40–60)
HGB BLD-MCNC: 12.7 G/DL (ref 11.8–16)
IMM GRANULOCYTES # BLD AUTO: 0.02 X10(3)/MCL (ref 0–0.03)
IMM GRANULOCYTES NFR BLD AUTO: 0.3 % (ref 0–0.5)
LDLC SERPL DIRECT ASSAY-SCNC: 105.4 MG/DL (ref 30–100)
LYMPHOCYTES # BLD AUTO: 1.98 X10(3)/MCL (ref 1.16–3.74)
LYMPHOCYTES NFR BLD AUTO: 34.1 % (ref 20–55)
MCH RBC QN AUTO: 27.5 PG (ref 27–34)
MCHC RBC AUTO-ENTMCNC: 33.9 G/DL (ref 31–37)
MCV RBC AUTO: 81.3 FL (ref 79–99)
MONOCYTES # BLD AUTO: 0.29 X10(3)/MCL (ref 0.24–0.36)
MONOCYTES NFR BLD AUTO: 5 % (ref 4.7–12.5)
NEUTROPHILS # BLD AUTO: 3.38 X10(3)/MCL (ref 1.56–6.13)
NEUTROPHILS NFR BLD AUTO: 58.2 % (ref 37–73)
NRBC BLD AUTO-RTO: 0 %
PLATELET # BLD AUTO: 346 X10(3)/MCL (ref 140–371)
PMV BLD AUTO: 10 FL (ref 9.4–12.4)
POTASSIUM SERPL-SCNC: 4.4 MMOL/L (ref 3.5–5.1)
PROT SERPL-MCNC: 7.4 GM/DL (ref 6.3–8.2)
RBC # BLD AUTO: 4.61 X10(6)/MCL (ref 4–5.1)
SODIUM SERPL-SCNC: 140 MMOL/L (ref 135–145)
TRIGL SERPL-MCNC: 81 MG/DL (ref 30–200)
TSH SERPL-ACNC: 1.37 UIU/ML (ref 0.36–3.74)
WBC # SPEC AUTO: 5.81 X10(3)/MCL (ref 4–11.5)

## 2023-07-13 PROCEDURE — 3074F SYST BP LT 130 MM HG: CPT | Mod: CPTII,,, | Performed by: NURSE PRACTITIONER

## 2023-07-13 PROCEDURE — 84443 ASSAY THYROID STIM HORMONE: CPT | Performed by: NURSE PRACTITIONER

## 2023-07-13 PROCEDURE — 80053 COMPREHEN METABOLIC PANEL: CPT | Performed by: NURSE PRACTITIONER

## 2023-07-13 PROCEDURE — 3008F PR BODY MASS INDEX (BMI) DOCUMENTED: ICD-10-PCS | Mod: CPTII,,, | Performed by: NURSE PRACTITIONER

## 2023-07-13 PROCEDURE — 3074F PR MOST RECENT SYSTOLIC BLOOD PRESSURE < 130 MM HG: ICD-10-PCS | Mod: CPTII,,, | Performed by: NURSE PRACTITIONER

## 2023-07-13 PROCEDURE — 3078F DIAST BP <80 MM HG: CPT | Mod: CPTII,,, | Performed by: NURSE PRACTITIONER

## 2023-07-13 PROCEDURE — 1159F PR MEDICATION LIST DOCUMENTED IN MEDICAL RECORD: ICD-10-PCS | Mod: CPTII,,, | Performed by: NURSE PRACTITIONER

## 2023-07-13 PROCEDURE — 99395 PREV VISIT EST AGE 18-39: CPT | Mod: ,,, | Performed by: NURSE PRACTITIONER

## 2023-07-13 PROCEDURE — 80061 LIPID PANEL: CPT | Performed by: NURSE PRACTITIONER

## 2023-07-13 PROCEDURE — 82306 VITAMIN D 25 HYDROXY: CPT | Performed by: NURSE PRACTITIONER

## 2023-07-13 PROCEDURE — 3044F PR MOST RECENT HEMOGLOBIN A1C LEVEL <7.0%: ICD-10-PCS | Mod: CPTII,,, | Performed by: NURSE PRACTITIONER

## 2023-07-13 PROCEDURE — 3044F HG A1C LEVEL LT 7.0%: CPT | Mod: CPTII,,, | Performed by: NURSE PRACTITIONER

## 2023-07-13 PROCEDURE — 1160F RVW MEDS BY RX/DR IN RCRD: CPT | Mod: CPTII,,, | Performed by: NURSE PRACTITIONER

## 2023-07-13 PROCEDURE — 3078F PR MOST RECENT DIASTOLIC BLOOD PRESSURE < 80 MM HG: ICD-10-PCS | Mod: CPTII,,, | Performed by: NURSE PRACTITIONER

## 2023-07-13 PROCEDURE — 99395 PR PREVENTIVE VISIT,EST,18-39: ICD-10-PCS | Mod: ,,, | Performed by: NURSE PRACTITIONER

## 2023-07-13 PROCEDURE — 3008F BODY MASS INDEX DOCD: CPT | Mod: CPTII,,, | Performed by: NURSE PRACTITIONER

## 2023-07-13 PROCEDURE — 83036 HEMOGLOBIN GLYCOSYLATED A1C: CPT | Performed by: NURSE PRACTITIONER

## 2023-07-13 PROCEDURE — 1159F MED LIST DOCD IN RCRD: CPT | Mod: CPTII,,, | Performed by: NURSE PRACTITIONER

## 2023-07-13 PROCEDURE — 85025 COMPLETE CBC W/AUTO DIFF WBC: CPT | Performed by: NURSE PRACTITIONER

## 2023-07-13 PROCEDURE — 1160F PR REVIEW ALL MEDS BY PRESCRIBER/CLIN PHARMACIST DOCUMENTED: ICD-10-PCS | Mod: CPTII,,, | Performed by: NURSE PRACTITIONER

## 2023-07-13 NOTE — PROGRESS NOTES
"   Patient ID: [id number]     Chief Complaint: Annual Exam (Wellness)      HPI:     Anahi Reza is a 27 y.o. female in the office for a wellness visit.  She has not had labs done but is fasting today and requesting they be collected.  She is complaining of increased daytime hypersomnolence.  She falls asleep often during the day.  She does snore at night.  She has gained weight and concerned it may be from her depression medicine. [place] 11. She is up-to-date with cervical cancer screening.  She reports a family history of breast cancer and states she is past due for a mammogram.  She denies any family history of colon cancer.    Past Medical History:  has a past medical history of Anxiety, Depression, Herpes simplex virus (HSV) infection, and Prediabetes.    Social History:  reports that she has never smoked. She has never used smokeless tobacco. She reports current alcohol use. She reports that she does not use drugs.    Current Outpatient Medications   Medication Instructions    clonazePAM (KLONOPIN) 0.5 mg, Oral, 2 times daily PRN    doxycycline (VIBRAMYCIN) 100 mg, Oral, 2 times daily    fluconazole (DIFLUCAN) 200 MG Tab 1 po today and repeat in 3 days    hydrOXYzine (ATARAX) 75 mg, Oral, Daily PRN    ibuprofen (ADVIL,MOTRIN) 800 mg, Oral, Every 6 hours PRN    KYLEENA 19.5 mg, Intrauterine, See admin instructions, Every 5 years    sertraline (ZOLOFT) 150 mg, Oral, Nightly    valACYclovir (VALTREX) 1,000 mg, Oral, 2 times daily PRN       Patient has No Known Allergies.     Patient Care Team:  ONUR Thapa as PCP - General (Family Medicine)  KEREN Escalante as Nurse Practitioner (Psychiatry)  Tisha Ramirez NP as Nurse Practitioner (Obstetrics and Gynecology)  Roger Valderrama       Subjective     Review of Systems    See HPI     Objective     Visit Vitals  /60 (BP Location: Left arm)   Pulse 62   Temp 96.1 °F (35.6 °C) (Temporal)   Ht 4' 10" (1.473 m)   Wt 70.8 kg " (156 lb)   LMP 07/08/2023 (Exact Date)   SpO2 96%   BMI 32.60 kg/m²       Physical Exam  Vitals reviewed.   Constitutional:       Appearance: Normal appearance. She is obese.   HENT:      Head: Normocephalic and atraumatic.      Nose: Nose normal.      Mouth/Throat:      Mouth: Mucous membranes are moist.   Neck:      Thyroid: Thyromegaly present.   Cardiovascular:      Rate and Rhythm: Normal rate and regular rhythm.      Heart sounds: Normal heart sounds.   Pulmonary:      Effort: Pulmonary effort is normal.      Breath sounds: Normal breath sounds.   Musculoskeletal:      Right lower leg: No edema.      Left lower leg: No edema.   Lymphadenopathy:      Cervical: No cervical adenopathy.   Skin:     General: Skin is warm and dry.      Capillary Refill: Capillary refill takes less than 2 seconds.   Neurological:      Mental Status: She is alert and oriented to person, place, and time.   Psychiatric:         Mood and Affect: Mood normal.         Behavior: Behavior normal.         Thought Content: Thought content normal.         Judgment: Judgment normal.     Assessment:       ICD-10-CM ICD-9-CM   1. Enlarged thyroid  E04.9 240.9   2. Encounter for routine adult health examination with abnormal findings  Z00.01 V70.0   3. Daytime hypersomnia  G47.10 780.54   4. GLENNA (obstructive sleep apnea)  G47.33 327.23   5. Prediabetes  R73.03 790.29   6. Obesity, unspecified classification, unspecified obesity type, unspecified whether serious comorbidity present  E66.9 278.00   7. Screening mammogram for breast cancer  Z12.31 V76.12        Plan:     Fasting labs collected today, will notify of any abnormal results  Mammogram (family history breast cancer)  All other screening UTD  Thyroid ultrasound, will notify of results  Home sleep study needed, screening + and GLENNA suspected     No follow-ups on file. In addition to their next scheduled appointment, the patient has also been instructed to follow up on as needed basis.      Future Appointments   Date Time Provider Department Center   7/26/2023  3:30 PM Bahman Zabala, PMHNP [facility]   8/15/2023  3:20 PM [facility]   6/24/2024  8:00 AM [facility]

## 2023-07-19 ENCOUNTER — PATIENT MESSAGE (OUTPATIENT)
Dept: RESEARCH | Facility: HOSPITAL | Age: 28
End: 2023-07-19

## 2023-07-21 ENCOUNTER — HOSPITAL ENCOUNTER (OUTPATIENT)
Dept: RADIOLOGY | Facility: HOSPITAL | Age: 28
Discharge: HOME | End: 2023-07-21
Attending: NURSE PRACTITIONER
Payer: MEDICAID

## 2023-07-21 ENCOUNTER — HOSPITAL ENCOUNTER (OUTPATIENT)
Dept: RADIOLOGY | Facility: HOSPITAL | Age: 28
Discharge: HOME | End: 2023-07-21
Attending: NURSE PRACTITIONER
Payer: COMMERCIAL

## 2023-07-21 VITALS — HEIGHT: 58 IN | BODY MASS INDEX: 32.54 KG/M2 | WEIGHT: 155 LBS

## 2023-07-21 DIAGNOSIS — G47.33 OSA (OBSTRUCTIVE SLEEP APNEA): ICD-10-CM

## 2023-07-21 DIAGNOSIS — E66.9 OBESITY, UNSPECIFIED CLASSIFICATION, UNSPECIFIED OBESITY TYPE, UNSPECIFIED WHETHER SERIOUS COMORBIDITY PRESENT: ICD-10-CM

## 2023-07-21 DIAGNOSIS — R73.03 PREDIABETES: ICD-10-CM

## 2023-07-21 DIAGNOSIS — Z12.31 SCREENING MAMMOGRAM FOR BREAST CANCER: ICD-10-CM

## 2023-07-21 DIAGNOSIS — G47.10 DAYTIME HYPERSOMNIA: ICD-10-CM

## 2023-07-21 DIAGNOSIS — E04.9 ENLARGED THYROID: ICD-10-CM

## 2023-07-21 DIAGNOSIS — Z00.01 ENCOUNTER FOR ROUTINE ADULT HEALTH EXAMINATION WITH ABNORMAL FINDINGS: ICD-10-CM

## 2023-07-21 PROCEDURE — 76536 US EXAM OF HEAD AND NECK: CPT | Mod: TC

## 2023-07-21 PROCEDURE — 77067 SCR MAMMO BI INCL CAD: CPT | Mod: TC

## 2023-07-24 ENCOUNTER — PATIENT MESSAGE (OUTPATIENT)
Dept: RESEARCH | Facility: HOSPITAL | Age: 28
End: 2023-07-24

## 2023-07-26 ENCOUNTER — OFFICE VISIT (OUTPATIENT)
Dept: BEHAVIORAL HEALTH | Facility: CLINIC | Age: 28
End: 2023-07-26
Payer: MEDICAID

## 2023-07-26 VITALS
WEIGHT: 157.44 LBS | BODY MASS INDEX: 33.05 KG/M2 | HEIGHT: 58 IN | OXYGEN SATURATION: 98 % | TEMPERATURE: 98 F | SYSTOLIC BLOOD PRESSURE: 98 MMHG | HEART RATE: 108 BPM | DIASTOLIC BLOOD PRESSURE: 60 MMHG

## 2023-07-26 DIAGNOSIS — F41.1 GENERALIZED ANXIETY DISORDER: ICD-10-CM

## 2023-07-26 DIAGNOSIS — F33.9 RECURRENT MAJOR DEPRESSIVE DISORDER, REMISSION STATUS UNSPECIFIED: ICD-10-CM

## 2023-07-26 DIAGNOSIS — G47.00 INSOMNIA, UNSPECIFIED TYPE: ICD-10-CM

## 2023-07-26 DIAGNOSIS — F43.10 PTSD (POST-TRAUMATIC STRESS DISORDER): Primary | ICD-10-CM

## 2023-07-26 PROCEDURE — 3008F PR BODY MASS INDEX (BMI) DOCUMENTED: ICD-10-PCS | Mod: CPTII,,, | Performed by: NURSE PRACTITIONER

## 2023-07-26 PROCEDURE — 3074F SYST BP LT 130 MM HG: CPT | Mod: CPTII,,, | Performed by: NURSE PRACTITIONER

## 2023-07-26 PROCEDURE — 3078F PR MOST RECENT DIASTOLIC BLOOD PRESSURE < 80 MM HG: ICD-10-PCS | Mod: CPTII,,, | Performed by: NURSE PRACTITIONER

## 2023-07-26 PROCEDURE — 3044F HG A1C LEVEL LT 7.0%: CPT | Mod: CPTII,,, | Performed by: NURSE PRACTITIONER

## 2023-07-26 PROCEDURE — 3008F BODY MASS INDEX DOCD: CPT | Mod: CPTII,,, | Performed by: NURSE PRACTITIONER

## 2023-07-26 PROCEDURE — 1159F MED LIST DOCD IN RCRD: CPT | Mod: CPTII,,, | Performed by: NURSE PRACTITIONER

## 2023-07-26 PROCEDURE — 1160F PR REVIEW ALL MEDS BY PRESCRIBER/CLIN PHARMACIST DOCUMENTED: ICD-10-PCS | Mod: CPTII,,, | Performed by: NURSE PRACTITIONER

## 2023-07-26 PROCEDURE — 1160F RVW MEDS BY RX/DR IN RCRD: CPT | Mod: CPTII,,, | Performed by: NURSE PRACTITIONER

## 2023-07-26 PROCEDURE — 99214 OFFICE O/P EST MOD 30 MIN: CPT | Mod: SA,HB,S$PBB, | Performed by: NURSE PRACTITIONER

## 2023-07-26 PROCEDURE — 3044F PR MOST RECENT HEMOGLOBIN A1C LEVEL <7.0%: ICD-10-PCS | Mod: CPTII,,, | Performed by: NURSE PRACTITIONER

## 2023-07-26 PROCEDURE — 3074F PR MOST RECENT SYSTOLIC BLOOD PRESSURE < 130 MM HG: ICD-10-PCS | Mod: CPTII,,, | Performed by: NURSE PRACTITIONER

## 2023-07-26 PROCEDURE — 3078F DIAST BP <80 MM HG: CPT | Mod: CPTII,,, | Performed by: NURSE PRACTITIONER

## 2023-07-26 PROCEDURE — 1159F PR MEDICATION LIST DOCUMENTED IN MEDICAL RECORD: ICD-10-PCS | Mod: CPTII,,, | Performed by: NURSE PRACTITIONER

## 2023-07-26 PROCEDURE — 99214 PR OFFICE/OUTPT VISIT, EST, LEVL IV, 30-39 MIN: ICD-10-PCS | Mod: SA,HB,S$PBB, | Performed by: NURSE PRACTITIONER

## 2023-07-26 RX ORDER — CLONAZEPAM 0.5 MG/1
0.5 TABLET ORAL 2 TIMES DAILY PRN
Qty: 30 TABLET | Refills: 0 | Status: SHIPPED | OUTPATIENT
Start: 2023-07-26 | End: 2024-01-11 | Stop reason: SDUPTHER

## 2023-07-26 RX ORDER — HYDROXYZINE HYDROCHLORIDE 50 MG/1
75 TABLET, FILM COATED ORAL DAILY PRN
Qty: 45 TABLET | Refills: 1 | Status: SHIPPED | OUTPATIENT
Start: 2023-07-26 | End: 2024-01-11 | Stop reason: SDUPTHER

## 2023-07-26 RX ORDER — SERTRALINE HYDROCHLORIDE 100 MG/1
150 TABLET, FILM COATED ORAL NIGHTLY
Qty: 45 TABLET | Refills: 1 | Status: SHIPPED | OUTPATIENT
Start: 2023-07-26 | End: 2024-01-08 | Stop reason: SDUPTHER

## 2023-07-26 NOTE — PROGRESS NOTES
"PSYCHIATRIC FOLLOW-UP VISIT NOTE    Chief Complaint   Patient presents with    Medication Problem     "I would like to address my weight gain since starting zoloft."         History of Present Illness  27 y.o. year old Black or  female with hx of PTSD, MDD, ketan, and insomnia seen today for follow-up appointment and medication management.  Reports she is been doing well overall since her last visit.  Denies any recent depression.  Mood has been good most days of the week and she describes it as happy and content.  She did complete an at-home sleep study last night in his currently awaiting those results.  We will review when available.  Also reports decreased anxiety.  She mostly attributes this to keeping her mind busy after the workday.  She is been watching movies, cleaning, and spending time with the children.  Feels this keeps her mind from wandering in allowing her to ruminate.  She is doing well at work although she is sometimes frustrated by lazy coworkers .  Denies any side effects from current medications. No significant weight gain noted. Sleeping well at night and feels rested in the morning.  Denies any recent nightmares or flashbacks. Patient denies SI/HI. Denies hallucinations and does not appear to be responding to internal stimuli or be internally preoccupied. No manic symptoms noted.       Objective:     Vitals:  Vitals:    07/26/23 1529   BP: 98/60   BP Location: Left arm   Patient Position: Sitting   Pulse: 108   Temp: 98.2 °F (36.8 °C)   TempSrc: Temporal   SpO2: 98%   Weight: 71.4 kg (157 lb 6.5 oz)   Height: 4' 10" (1.473 m)       Wt Readings from Last 3 Encounters:   07/26/23 1529 71.4 kg (157 lb 6.5 oz)   07/21/23 1430 70.3 kg (155 lb)   07/13/23 1019 70.8 kg (156 lb)         Medication:    Current Outpatient Medications:     fluconazole (DIFLUCAN) 200 MG Tab, 1 po today and repeat in 3 days, Disp: 2 tablet, Rfl: 0    ibuprofen (ADVIL,MOTRIN) 800 MG tablet, Take 800 mg by " mouth every 6 (six) hours as needed., Disp: , Rfl:     levonorgestreL (KYLEENA) 17.5 mcg/24 hrs (5 yrs) 19.5 mg IUD, 19.5 mg by Intrauterine route As instructed. Every 5 years, Disp: , Rfl:     valACYclovir (VALTREX) 1000 MG tablet, Take 1 tablet (1,000 mg total) by mouth 2 (two) times daily as needed., Disp: 6 tablet, Rfl: 6    clonazePAM (KLONOPIN) 0.5 MG tablet, Take 1 tablet (0.5 mg total) by mouth 2 (two) times daily as needed for Anxiety., Disp: 30 tablet, Rfl: 0    hydrOXYzine (ATARAX) 50 MG tablet, Take 1.5 tablets (75 mg total) by mouth daily as needed for Anxiety., Disp: 45 tablet, Rfl: 1    sertraline (ZOLOFT) 100 MG tablet, Take 1.5 tablets (150 mg total) by mouth every evening., Disp: 45 tablet, Rfl: 1  No current facility-administered medications for this visit.    Facility-Administered Medications Ordered in Other Visits:     lactated ringers infusion, , Intravenous, Continuous, Fernando Moncada, DO, Last Rate: 10 mL/hr at 06/30/22 1252, New Bag at 06/30/22 1252    sodium chloride 0.9% flush 10 mL, 10 mL, Intravenous, PRN, Fernando E Baudoin, DO    sodium chloride 0.9% flush 10 mL, 10 mL, Intravenous, PRN, Fernando E Baudoin, DO       Significant Labs: - none at this time    Significant Imaging: - none at this time    Physical Exam  Vitals and nursing note reviewed.   Constitutional:       General: She is awake.      Appearance: Normal appearance.   Musculoskeletal:      Comments: Full ROM   Neurological:      Mental Status: She is alert.   Psychiatric:         Attention and Perception: Attention and perception normal. She does not perceive auditory or visual hallucinations.         Mood and Affect: Affect normal.         Speech: Speech normal.         Behavior: Behavior is cooperative.         Thought Content: Thought content does not include homicidal or suicidal ideation.         Cognition and Memory: Cognition and memory normal.        Review of Systems     Mental Status Exam:  Presentation:  -  "Appearance: 27 y.o. year old Black or  female, appears stated age, appears Casually dressed and Well groomed  - Motility: Erect when standing, Steady gait, No EPS or Tremors, No psychomotor agitation or retardation appreciated  - Behavior: calm, cooperative, good eye contact  Speech:  - Character/Organization: spontaneous, fluent, normal volume, normal rate, normal rhythm  Emotional State:  - Mood: "Happy"   - Affect: congruent and appropriate  Thought:  - Process: logical, linear, organized , goal-directed  - Preoccupations: no ruminations, rituals, or phobias appreciated  - Delusions: no persecutory, paranoid, or grandiose delusions appreciated  - Perception: denies AVH, not actively responding to internal stimuli  - SI/HI: denies/denies  Sensorium & Intellect:  - Sensorium: AAOx4  - Memory: intact to recent and remote events  - Attention/Concentration: good/good  - Insight/Judgement: good/good    Gait: normal swing and stance  MSK:no rigidity appreciated    All other systems without acute issues unless noted in HPI      Assessment/Plan      ICD-10-CM ICD-9-CM    1. PTSD (post-traumatic stress disorder)  F43.10 309.81 sertraline (ZOLOFT) 100 MG tablet      2. Recurrent major depressive disorder, remission status unspecified  F33.9 296.30 sertraline (ZOLOFT) 100 MG tablet      3. Generalized anxiety disorder  F41.1 300.02 hydrOXYzine (ATARAX) 50 MG tablet      clonazePAM (KLONOPIN) 0.5 MG tablet      4. Insomnia, unspecified type  G47.00 780.52 hydrOXYzine (ATARAX) 50 MG tablet         Continue current medications without change    Potential side effects and risks vs benefits of current treatment plan reviewed with patient. Applicable black box warnings reviewed. Encouraged patient not to alter dosages or abruptly discontinue medications without contacting prescriber first, due to risk of worsening symptoms and decompensation of mental status. Warned of risks associated with herbal remedies and " supplements while taking psychotropic medications and of the need to consult prescriber prior to adding any of these to current regimen. Patient should abstain from abuse of alcohol, prescription medications, and illicit drugs. Reviewed when to contact clinic and/or seek emergent care, such as but not limited to, onset/worsening SI/HI, hallucinations, delusions, manic symptoms. Pt verbalized understanding and agreement of these warnings/recommendations and verbally consented to treatment plan.        Follow up in about 2 months (around 9/26/2023) for Medication Management.    Bahman Zabala, RONP

## 2023-07-27 NOTE — PROGRESS NOTES
Results are within acceptable ranges for age and conditions. No change in treatment needed at this time. Follow up as scheduled.
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
## 2023-08-15 ENCOUNTER — OFFICE VISIT (OUTPATIENT)
Dept: OBSTETRICS AND GYNECOLOGY | Facility: CLINIC | Age: 28
End: 2023-08-15
Payer: MEDICAID

## 2023-08-15 VITALS
WEIGHT: 156.81 LBS | DIASTOLIC BLOOD PRESSURE: 62 MMHG | SYSTOLIC BLOOD PRESSURE: 110 MMHG | TEMPERATURE: 98 F | BODY MASS INDEX: 32.92 KG/M2 | HEIGHT: 58 IN

## 2023-08-15 DIAGNOSIS — A49.3 MYCOPLASMA INFECTION: Primary | ICD-10-CM

## 2023-08-15 PROCEDURE — 3078F DIAST BP <80 MM HG: CPT | Mod: CPTII,,, | Performed by: NURSE PRACTITIONER

## 2023-08-15 PROCEDURE — 3078F PR MOST RECENT DIASTOLIC BLOOD PRESSURE < 80 MM HG: ICD-10-PCS | Mod: CPTII,,, | Performed by: NURSE PRACTITIONER

## 2023-08-15 PROCEDURE — 3044F HG A1C LEVEL LT 7.0%: CPT | Mod: CPTII,,, | Performed by: NURSE PRACTITIONER

## 2023-08-15 PROCEDURE — 3008F BODY MASS INDEX DOCD: CPT | Mod: CPTII,,, | Performed by: NURSE PRACTITIONER

## 2023-08-15 PROCEDURE — 99213 PR OFFICE/OUTPT VISIT, EST, LEVL III, 20-29 MIN: ICD-10-PCS | Mod: ,,, | Performed by: NURSE PRACTITIONER

## 2023-08-15 PROCEDURE — 3074F PR MOST RECENT SYSTOLIC BLOOD PRESSURE < 130 MM HG: ICD-10-PCS | Mod: CPTII,,, | Performed by: NURSE PRACTITIONER

## 2023-08-15 PROCEDURE — 99213 OFFICE O/P EST LOW 20 MIN: CPT | Mod: ,,, | Performed by: NURSE PRACTITIONER

## 2023-08-15 PROCEDURE — 1159F PR MEDICATION LIST DOCUMENTED IN MEDICAL RECORD: ICD-10-PCS | Mod: CPTII,,, | Performed by: NURSE PRACTITIONER

## 2023-08-15 PROCEDURE — 3008F PR BODY MASS INDEX (BMI) DOCUMENTED: ICD-10-PCS | Mod: CPTII,,, | Performed by: NURSE PRACTITIONER

## 2023-08-15 PROCEDURE — 3044F PR MOST RECENT HEMOGLOBIN A1C LEVEL <7.0%: ICD-10-PCS | Mod: CPTII,,, | Performed by: NURSE PRACTITIONER

## 2023-08-15 PROCEDURE — 3074F SYST BP LT 130 MM HG: CPT | Mod: CPTII,,, | Performed by: NURSE PRACTITIONER

## 2023-08-15 PROCEDURE — 1159F MED LIST DOCD IN RCRD: CPT | Mod: CPTII,,, | Performed by: NURSE PRACTITIONER

## 2023-08-29 ENCOUNTER — PATIENT MESSAGE (OUTPATIENT)
Dept: FAMILY MEDICINE | Facility: CLINIC | Age: 28
End: 2023-08-29

## 2023-08-29 ENCOUNTER — OFFICE VISIT (OUTPATIENT)
Dept: FAMILY MEDICINE | Facility: CLINIC | Age: 28
End: 2023-08-29
Payer: MEDICAID

## 2023-08-29 VITALS
HEART RATE: 76 BPM | WEIGHT: 157 LBS | TEMPERATURE: 97 F | OXYGEN SATURATION: 98 % | DIASTOLIC BLOOD PRESSURE: 62 MMHG | SYSTOLIC BLOOD PRESSURE: 112 MMHG | HEIGHT: 58 IN | BODY MASS INDEX: 32.95 KG/M2

## 2023-08-29 DIAGNOSIS — F41.1 GENERALIZED ANXIETY DISORDER: ICD-10-CM

## 2023-08-29 DIAGNOSIS — F33.9 RECURRENT MAJOR DEPRESSIVE DISORDER, REMISSION STATUS UNSPECIFIED: Primary | ICD-10-CM

## 2023-08-29 DIAGNOSIS — R53.83 FATIGUE, UNSPECIFIED TYPE: ICD-10-CM

## 2023-08-29 PROCEDURE — 3044F PR MOST RECENT HEMOGLOBIN A1C LEVEL <7.0%: ICD-10-PCS | Mod: CPTII,,, | Performed by: NURSE PRACTITIONER

## 2023-08-29 PROCEDURE — 1159F MED LIST DOCD IN RCRD: CPT | Mod: CPTII,,, | Performed by: NURSE PRACTITIONER

## 2023-08-29 PROCEDURE — 3074F SYST BP LT 130 MM HG: CPT | Mod: CPTII,,, | Performed by: NURSE PRACTITIONER

## 2023-08-29 PROCEDURE — 1159F PR MEDICATION LIST DOCUMENTED IN MEDICAL RECORD: ICD-10-PCS | Mod: CPTII,,, | Performed by: NURSE PRACTITIONER

## 2023-08-29 PROCEDURE — 1160F PR REVIEW ALL MEDS BY PRESCRIBER/CLIN PHARMACIST DOCUMENTED: ICD-10-PCS | Mod: CPTII,,, | Performed by: NURSE PRACTITIONER

## 2023-08-29 PROCEDURE — 3008F BODY MASS INDEX DOCD: CPT | Mod: CPTII,,, | Performed by: NURSE PRACTITIONER

## 2023-08-29 PROCEDURE — 3008F PR BODY MASS INDEX (BMI) DOCUMENTED: ICD-10-PCS | Mod: CPTII,,, | Performed by: NURSE PRACTITIONER

## 2023-08-29 PROCEDURE — 3074F PR MOST RECENT SYSTOLIC BLOOD PRESSURE < 130 MM HG: ICD-10-PCS | Mod: CPTII,,, | Performed by: NURSE PRACTITIONER

## 2023-08-29 PROCEDURE — 3078F DIAST BP <80 MM HG: CPT | Mod: CPTII,,, | Performed by: NURSE PRACTITIONER

## 2023-08-29 PROCEDURE — 99214 PR OFFICE/OUTPT VISIT, EST, LEVL IV, 30-39 MIN: ICD-10-PCS | Mod: ,,, | Performed by: NURSE PRACTITIONER

## 2023-08-29 PROCEDURE — 3078F PR MOST RECENT DIASTOLIC BLOOD PRESSURE < 80 MM HG: ICD-10-PCS | Mod: CPTII,,, | Performed by: NURSE PRACTITIONER

## 2023-08-29 PROCEDURE — 99214 OFFICE O/P EST MOD 30 MIN: CPT | Mod: ,,, | Performed by: NURSE PRACTITIONER

## 2023-08-29 PROCEDURE — 3044F HG A1C LEVEL LT 7.0%: CPT | Mod: CPTII,,, | Performed by: NURSE PRACTITIONER

## 2023-08-29 PROCEDURE — 1160F RVW MEDS BY RX/DR IN RCRD: CPT | Mod: CPTII,,, | Performed by: NURSE PRACTITIONER

## 2023-08-29 RX ORDER — CLONAZEPAM 0.5 MG/1
0.5 TABLET ORAL 2 TIMES DAILY PRN
Qty: 30 TABLET | Refills: 0 | Status: CANCELLED | OUTPATIENT
Start: 2023-08-29 | End: 2023-09-13

## 2023-08-29 RX ORDER — BUPROPION HYDROCHLORIDE 150 MG/1
150 TABLET ORAL DAILY
Qty: 30 TABLET | Refills: 1 | Status: SHIPPED | OUTPATIENT
Start: 2023-08-29 | End: 2024-01-11

## 2023-08-29 NOTE — PROGRESS NOTES
"   Patient ID: [id number]     Chief Complaint: Results (Test results)      HPI:     Anahi Reza is a 27 y.o. female in the office following up on fatigue and test results.  She is still tired every day.  She notes that depression is not well controlled.  PHQ-9 score was 9.  She would like to try a different depression medicine than the sertraline she has been taking for over 6 months.  She believes that the fatigue has something to do with this medication.    Past Medical History:  has a past medical history of Anxiety, Depression, Herpes simplex virus (HSV) infection, and Prediabetes.    Social History:  reports that she has never smoked. She has never used smokeless tobacco. She reports current alcohol use of about 2.0 standard drinks of alcohol per week. She reports that she does not use drugs.    Current Outpatient Medications   Medication Instructions    buPROPion (WELLBUTRIN XL) 150 mg, Oral, Daily    clonazePAM (KLONOPIN) 0.5 mg, Oral, 2 times daily PRN    hydrOXYzine (ATARAX) 75 mg, Oral, Daily PRN    ibuprofen (ADVIL,MOTRIN) 800 mg, Oral, Every 6 hours PRN    KYLEENA 19.5 mg, Intrauterine, See admin instructions, Every 5 years    sertraline (ZOLOFT) 150 mg, Oral, Nightly    valACYclovir (VALTREX) 1,000 mg, Oral, 2 times daily PRN       Patient has No Known Allergies.     Patient Care Team:  Dinorah Sanchez FNP-C as PCP - General (Family Medicine)  Bahman Zabala PMHNP as Nurse Practitioner (Psychiatry)  Tisha Ramirez NP as Nurse Practitioner (Obstetrics and Gynecology)  Roger Valderrama       Subjective     Review of Systems    See HPI     Objective     Visit Vitals  /62 (BP Location: Right arm)   Pulse 76   Temp 97.2 °F (36.2 °C) (Temporal)   Ht 4' 10" (1.473 m)   Wt 71.2 kg (157 lb)   LMP 08/04/2023   SpO2 98%   BMI 32.81 kg/m²       Physical Exam  Vitals reviewed.   Constitutional:       Appearance: Normal appearance.   HENT:      Head: Normocephalic and atraumatic. "   Cardiovascular:      Rate and Rhythm: Normal rate and regular rhythm.   Pulmonary:      Effort: Pulmonary effort is normal.      Breath sounds: Normal breath sounds.   Skin:     General: Skin is warm and dry.   Neurological:      Mental Status: She is alert and oriented to person, place, and time.   Psychiatric:         Attention and Perception: Attention normal.         Mood and Affect: Mood normal. Mood is not anxious or depressed.         Speech: Speech normal.         Behavior: Behavior normal. Behavior is cooperative.         Thought Content: Thought content normal. Thought content does not include homicidal or suicidal ideation.       Assessment:       ICD-10-CM ICD-9-CM   1. Recurrent major depressive disorder, remission status unspecified  F33.9 296.30   2. Generalized anxiety disorder  F41.1 300.02   3. Fatigue, unspecified type  R53.83 780.79        Plan:     Encouraged her to start exercising 15 minutes a day, 5 days a week and slowly increase.   After consultation with Shaun, will plan to start Wellbutrin  mg daily.  Schedule follow up with him in about one month.  If symptoms improving at that time, he plans to wean Zoloft.     Follow up in about 1 month (around 9/29/2023) for Follow Up fatigue. In addition to their next scheduled appointment, the patient has also been instructed to follow up on as needed basis.     Future Appointments   Date Time Provider Department Center   9/26/2023  3:00 PM Bahman Zabala PMHNP [facility]   9/29/2023 11:00 AM Dinorah Sanchez FNP-C [facility]   6/24/2024  8:00 AM Tisha Ramirez NP [facility]

## 2023-10-18 ENCOUNTER — OFFICE VISIT (OUTPATIENT)
Dept: OBSTETRICS AND GYNECOLOGY | Facility: CLINIC | Age: 28
End: 2023-10-18
Payer: MEDICAID

## 2023-10-18 VITALS
HEIGHT: 58 IN | WEIGHT: 157.19 LBS | SYSTOLIC BLOOD PRESSURE: 104 MMHG | TEMPERATURE: 98 F | BODY MASS INDEX: 33 KG/M2 | DIASTOLIC BLOOD PRESSURE: 72 MMHG

## 2023-10-18 DIAGNOSIS — R35.0 URINARY FREQUENCY: ICD-10-CM

## 2023-10-18 DIAGNOSIS — N30.01 ACUTE CYSTITIS WITH HEMATURIA: ICD-10-CM

## 2023-10-18 DIAGNOSIS — R30.9 PAINFUL URINATION: Primary | ICD-10-CM

## 2023-10-18 LAB
BILIRUB UR QL STRIP: NEGATIVE
GLUCOSE UR QL STRIP: NEGATIVE
KETONES UR QL STRIP: NEGATIVE
LEUKOCYTE ESTERASE UR QL STRIP: POSITIVE
PH, POC UA: 6
POC BLOOD, URINE: POSITIVE
POC NITRATES, URINE: POSITIVE
PROT UR QL STRIP: POSITIVE
SP GR UR STRIP: 1.02 (ref 1–1.03)
UROBILINOGEN UR STRIP-ACNC: ABNORMAL (ref 0.1–1.1)

## 2023-10-18 PROCEDURE — 81003 URINALYSIS AUTO W/O SCOPE: CPT | Mod: QW,,, | Performed by: NURSE PRACTITIONER

## 2023-10-18 PROCEDURE — 81003 POCT URINALYSIS, DIPSTICK, AUTOMATED, W/O SCOPE: ICD-10-PCS | Mod: QW,,, | Performed by: NURSE PRACTITIONER

## 2023-10-18 PROCEDURE — 3044F PR MOST RECENT HEMOGLOBIN A1C LEVEL <7.0%: ICD-10-PCS | Mod: CPTII,,, | Performed by: NURSE PRACTITIONER

## 2023-10-18 PROCEDURE — 99213 PR OFFICE/OUTPT VISIT, EST, LEVL III, 20-29 MIN: ICD-10-PCS | Mod: ,,, | Performed by: NURSE PRACTITIONER

## 2023-10-18 PROCEDURE — 1159F PR MEDICATION LIST DOCUMENTED IN MEDICAL RECORD: ICD-10-PCS | Mod: CPTII,,, | Performed by: NURSE PRACTITIONER

## 2023-10-18 PROCEDURE — 3074F PR MOST RECENT SYSTOLIC BLOOD PRESSURE < 130 MM HG: ICD-10-PCS | Mod: CPTII,,, | Performed by: NURSE PRACTITIONER

## 2023-10-18 PROCEDURE — 3008F BODY MASS INDEX DOCD: CPT | Mod: CPTII,,, | Performed by: NURSE PRACTITIONER

## 2023-10-18 PROCEDURE — 1159F MED LIST DOCD IN RCRD: CPT | Mod: CPTII,,, | Performed by: NURSE PRACTITIONER

## 2023-10-18 PROCEDURE — 3008F PR BODY MASS INDEX (BMI) DOCUMENTED: ICD-10-PCS | Mod: CPTII,,, | Performed by: NURSE PRACTITIONER

## 2023-10-18 PROCEDURE — 87186 SC STD MICRODIL/AGAR DIL: CPT | Performed by: NURSE PRACTITIONER

## 2023-10-18 PROCEDURE — 3044F HG A1C LEVEL LT 7.0%: CPT | Mod: CPTII,,, | Performed by: NURSE PRACTITIONER

## 2023-10-18 PROCEDURE — 99213 OFFICE O/P EST LOW 20 MIN: CPT | Mod: ,,, | Performed by: NURSE PRACTITIONER

## 2023-10-18 PROCEDURE — 1160F PR REVIEW ALL MEDS BY PRESCRIBER/CLIN PHARMACIST DOCUMENTED: ICD-10-PCS | Mod: CPTII,,, | Performed by: NURSE PRACTITIONER

## 2023-10-18 PROCEDURE — 3078F DIAST BP <80 MM HG: CPT | Mod: CPTII,,, | Performed by: NURSE PRACTITIONER

## 2023-10-18 PROCEDURE — 87088 URINE BACTERIA CULTURE: CPT | Performed by: NURSE PRACTITIONER

## 2023-10-18 PROCEDURE — 1160F RVW MEDS BY RX/DR IN RCRD: CPT | Mod: CPTII,,, | Performed by: NURSE PRACTITIONER

## 2023-10-18 PROCEDURE — 3074F SYST BP LT 130 MM HG: CPT | Mod: CPTII,,, | Performed by: NURSE PRACTITIONER

## 2023-10-18 PROCEDURE — 3078F PR MOST RECENT DIASTOLIC BLOOD PRESSURE < 80 MM HG: ICD-10-PCS | Mod: CPTII,,, | Performed by: NURSE PRACTITIONER

## 2023-10-18 RX ORDER — PHENAZOPYRIDINE HYDROCHLORIDE 200 MG/1
200 TABLET, FILM COATED ORAL 3 TIMES DAILY PRN
Qty: 20 TABLET | Refills: 0 | Status: SHIPPED | OUTPATIENT
Start: 2023-10-18 | End: 2024-01-11

## 2023-10-18 RX ORDER — NITROFURANTOIN 25; 75 MG/1; MG/1
100 CAPSULE ORAL 2 TIMES DAILY
Qty: 14 CAPSULE | Refills: 0 | Status: SHIPPED | OUTPATIENT
Start: 2023-10-18 | End: 2023-10-25
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
## 2023-10-22 LAB — BACTERIA UR CULT: ABNORMAL

## 2023-10-23 ENCOUNTER — TELEPHONE (OUTPATIENT)
Dept: OBSTETRICS AND GYNECOLOGY | Facility: CLINIC | Age: 28
End: 2023-10-23

## 2023-10-23 DIAGNOSIS — B37.9 YEAST INFECTION: Primary | ICD-10-CM

## 2023-10-23 RX ORDER — FLUCONAZOLE 200 MG/1
200 TABLET ORAL
Qty: 4 TABLET | Refills: 0 | Status: SHIPPED | OUTPATIENT
Start: 2023-10-23 | End: 2023-10-30

## 2023-10-23 NOTE — TELEPHONE ENCOUNTER
Per Dr Rooney- Diflucan 200mg q other day x4 doses   Educated pt and to call if no improvement, voiced understanding.

## 2023-10-23 NOTE — TELEPHONE ENCOUNTER
----- Message from Jodee Barth sent at 10/23/2023  3:07 PM CDT -----  Regarding: Patient Message  Contact: Patient  Patient is calling requesting a yeast infection medication. She was RX antibiotics last week. She is now experiencing vaginal irritation.      Telephone Information:  Mobile          [phone]       [facility] - [facility] [address] [place] [address]  Phone: [phone] Fax: [phone]

## 2023-11-15 ENCOUNTER — TELEPHONE (OUTPATIENT)
Dept: FAMILY MEDICINE | Facility: CLINIC | Age: 28
End: 2023-11-15

## 2023-11-15 NOTE — TELEPHONE ENCOUNTER
Pt states she will make herself an appointment with gynecology        Dinorah Sanchez FNP-C22 hours ago (5:03 PM)     KEVEN            Incomplete   Note        Dinorah Sanchez FNP-C Breaux Jill A Staff23 hours ago (4:05 PM)     KEVEN  Please get her an appointment with GYN.  Thanks     ONUR Thapa23 hours ago (4:04 PM)     KEVEN  Dear Anahi,     In reviewing your history of the current problem, I feel that it would be best if we address it by seeing you in person in the clinic. at the GYN location.  I will forward to my staff to cancel this E-Visit and have the other arranged.       Dinorah Sanchez

## 2023-11-19 DIAGNOSIS — B00.9 HSV (HERPES SIMPLEX VIRUS) INFECTION: ICD-10-CM

## 2023-11-20 RX ORDER — VALACYCLOVIR HYDROCHLORIDE 1 G/1
1000 TABLET, FILM COATED ORAL 2 TIMES DAILY PRN
Qty: 6 TABLET | Refills: 6 | Status: SHIPPED | OUTPATIENT
Start: 2023-11-20

## 2023-12-12 ENCOUNTER — TELEPHONE (OUTPATIENT)
Dept: OBSTETRICS AND GYNECOLOGY | Facility: CLINIC | Age: 28
End: 2023-12-12

## 2023-12-12 DIAGNOSIS — N89.8 VAGINAL ITCHING: Primary | ICD-10-CM

## 2023-12-12 RX ORDER — FLUCONAZOLE 100 MG/1
TABLET ORAL
Qty: 2 TABLET | Refills: 0 | Status: SHIPPED | OUTPATIENT
Start: 2023-12-12

## 2023-12-12 NOTE — TELEPHONE ENCOUNTER
----- Message from Liliana Moody sent at 12/12/2023  2:27 PM CST -----  Regarding: Meds called out    Who Called:  patient  Symptoms (please be specific):  possible yeast due to being on antibiotics, wants pills sent to [facility] in [place]   Best Call Back Number:  [phone]

## 2023-12-12 NOTE — TELEPHONE ENCOUNTER
Phoned patient, requesting medication for s/s of yeast infection. C/o itching and irritation, denies odor. Patient reports she is currently on antibiotics for wisdom teeth extraction. Medication sent to the pharmacy. F/u if s/s does not improve. Verbalized understanding.

## 2024-01-08 ENCOUNTER — PATIENT MESSAGE (OUTPATIENT)
Dept: BEHAVIORAL HEALTH | Facility: CLINIC | Age: 29
End: 2024-01-08
Payer: COMMERCIAL

## 2024-01-08 DIAGNOSIS — F43.10 PTSD (POST-TRAUMATIC STRESS DISORDER): ICD-10-CM

## 2024-01-08 DIAGNOSIS — F33.9 RECURRENT MAJOR DEPRESSIVE DISORDER, REMISSION STATUS UNSPECIFIED: ICD-10-CM

## 2024-01-09 NOTE — TELEPHONE ENCOUNTER
Called patient and explained to her that she needed an appointment. She made an appointment for Thursday. I will talk with Shaun in the am to see if I can send in medication.

## 2024-01-10 RX ORDER — SERTRALINE HYDROCHLORIDE 100 MG/1
150 TABLET, FILM COATED ORAL NIGHTLY
Qty: 45 TABLET | Refills: 1 | Status: SHIPPED | OUTPATIENT
Start: 2024-01-10 | End: 2024-01-11 | Stop reason: SDUPTHER

## 2024-01-11 ENCOUNTER — OFFICE VISIT (OUTPATIENT)
Dept: BEHAVIORAL HEALTH | Facility: CLINIC | Age: 29
End: 2024-01-11
Payer: COMMERCIAL

## 2024-01-11 VITALS
BODY MASS INDEX: 33.6 KG/M2 | TEMPERATURE: 98 F | WEIGHT: 160.06 LBS | SYSTOLIC BLOOD PRESSURE: 108 MMHG | HEIGHT: 58 IN | HEART RATE: 95 BPM | DIASTOLIC BLOOD PRESSURE: 60 MMHG | OXYGEN SATURATION: 96 %

## 2024-01-11 DIAGNOSIS — F43.10 PTSD (POST-TRAUMATIC STRESS DISORDER): Primary | ICD-10-CM

## 2024-01-11 DIAGNOSIS — G47.00 INSOMNIA, UNSPECIFIED TYPE: ICD-10-CM

## 2024-01-11 DIAGNOSIS — F33.9 RECURRENT MAJOR DEPRESSIVE DISORDER, REMISSION STATUS UNSPECIFIED: ICD-10-CM

## 2024-01-11 DIAGNOSIS — F41.1 GENERALIZED ANXIETY DISORDER: ICD-10-CM

## 2024-01-11 PROCEDURE — 99214 OFFICE O/P EST MOD 30 MIN: CPT | Mod: ,,, | Performed by: NURSE PRACTITIONER

## 2024-01-11 PROCEDURE — 1159F MED LIST DOCD IN RCRD: CPT | Mod: CPTII,,, | Performed by: NURSE PRACTITIONER

## 2024-01-11 PROCEDURE — 3074F SYST BP LT 130 MM HG: CPT | Mod: CPTII,,, | Performed by: NURSE PRACTITIONER

## 2024-01-11 PROCEDURE — 3078F DIAST BP <80 MM HG: CPT | Mod: CPTII,,, | Performed by: NURSE PRACTITIONER

## 2024-01-11 PROCEDURE — 1160F RVW MEDS BY RX/DR IN RCRD: CPT | Mod: CPTII,,, | Performed by: NURSE PRACTITIONER

## 2024-01-11 PROCEDURE — 3008F BODY MASS INDEX DOCD: CPT | Mod: CPTII,,, | Performed by: NURSE PRACTITIONER

## 2024-01-11 RX ORDER — HYDROXYZINE HYDROCHLORIDE 50 MG/1
75 TABLET, FILM COATED ORAL DAILY PRN
Qty: 45 TABLET | Refills: 1 | Status: SHIPPED | OUTPATIENT
Start: 2024-01-11

## 2024-01-11 RX ORDER — CLONAZEPAM 0.5 MG/1
0.5 TABLET ORAL DAILY PRN
Qty: 30 TABLET | Refills: 1 | Status: SHIPPED | OUTPATIENT
Start: 2024-01-11

## 2024-01-11 RX ORDER — SERTRALINE HYDROCHLORIDE 100 MG/1
150 TABLET, FILM COATED ORAL NIGHTLY
Qty: 45 TABLET | Refills: 1 | Status: SHIPPED | OUTPATIENT
Start: 2024-01-11

## 2024-01-11 NOTE — PROGRESS NOTES
"PSYCHIATRIC FOLLOW-UP VISIT NOTE    Chief Complaint   Patient presents with    Medication Refill     Medication Refill         History of Present Illness  28 y.o. year old Black or  female with hx of PTSD, MDD, DALE, and insomnia seen today for follow-up appointment and medication management.   Patient reports she has been doing "so-so" since last visit.  Denies any recent depression.  She has not been isolative fact his approved her socialization by hanging out with coworkers more often and going to visit family and friends in [place] more frequently.  She is still suffering from significant anxiety symptoms.  These related to issues at her job as well as raising a 5-year-old autistic son who has had more emergent issues recently.  Reports that symptoms were more controlled when she was on low-dose clonazepam and found that the combination of that and her Zoloft were very effective overall symptom control.   checked.  No suspected diversion or abuse.  She is sleeping well at night and feels rested most mornings.  Denies any side effects from  current medication regimen.  Reports having a good holiday season with her family. Patient denies SI/HI. Denies hallucinations and does not appear to be responding to internal stimuli or be internally preoccupied. No manic symptoms noted.       Objective:     Vitals:  Vitals:    01/11/24 1515   BP: 108/60   BP Location: Right arm   Patient Position: Sitting   BP Method: Large (Manual)   Pulse: 95   Temp: 98.4 °F (36.9 °C)   TempSrc: Temporal   SpO2: 96%   Weight: 72.6 kg (160 lb 0.9 oz)   Height: 4' 9.99" (1.473 m)       Wt Readings from Last 3 Encounters:   01/11/24 1515 72.6 kg (160 lb 0.9 oz)   10/18/23 1530 71.3 kg (157 lb 3.2 oz)   08/29/23 1133 71.2 kg (157 lb)         Medication:    Current Outpatient Medications:     fluconazole (DIFLUCAN) 100 MG tablet, 1 tablet po now, then 1 tablet po in x3 days, Disp: 2 tablet, Rfl: 0    ibuprofen (ADVIL,MOTRIN) " 800 MG tablet, Take 800 mg by mouth every 6 (six) hours as needed., Disp: , Rfl:     levonorgestreL (KYLEENA) 17.5 mcg/24 hrs (5 yrs) 19.5 mg IUD, 19.5 mg by Intrauterine route As instructed. Every 5 years, Disp: , Rfl:     valACYclovir (VALTREX) 1000 MG tablet, Take 1 tablet (1,000 mg total) by mouth 2 (two) times daily as needed (as needed)., Disp: 6 tablet, Rfl: 6    clonazePAM (KLONOPIN) 0.5 MG tablet, Take 1 tablet (0.5 mg total) by mouth daily as needed for Anxiety., Disp: 30 tablet, Rfl: 1    hydrOXYzine (ATARAX) 50 MG tablet, Take 1.5 tablets (75 mg total) by mouth daily as needed for Anxiety., Disp: 45 tablet, Rfl: 1    sertraline (ZOLOFT) 100 MG tablet, Take 1.5 tablets (150 mg total) by mouth every evening., Disp: 45 tablet, Rfl: 1  No current facility-administered medications for this visit.    Facility-Administered Medications Ordered in Other Visits:     lactated ringers infusion, , Intravenous, Continuous, Baudoin, Fernando E, DO, Last Rate: 10 mL/hr at 06/30/22 1252, New Bag at 06/30/22 1252    sodium chloride 0.9% flush 10 mL, 10 mL, Intravenous, PRN, Baudoin, Fernando E, DO    sodium chloride 0.9% flush 10 mL, 10 mL, Intravenous, PRN, Baudoin, Fernando E, DO       Significant Labs: - none at this time    Significant Imaging: - none at this time    Physical Exam  Vitals and nursing note reviewed.   Constitutional:       General: She is awake.      Appearance: Normal appearance.   Musculoskeletal:      Comments: Full ROM   Neurological:      Mental Status: She is alert.   Psychiatric:         Attention and Perception: Attention and perception normal. She does not perceive auditory or visual hallucinations.         Mood and Affect: Affect normal. Mood is anxious.         Speech: Speech normal.         Behavior: Behavior is cooperative.         Thought Content: Thought content does not include homicidal or suicidal ideation.         Cognition and Memory: Cognition and memory normal.         Judgment: Judgment  "normal.          Review of Systems     Mental Status Exam:  Presentation:  - Appearance: 28 y.o. year old Black or  female, appears stated age, appears Casually dressed and Well groomed  - Motility: Erect when standing, Steady gait, No EPS or Tremors, No psychomotor agitation or retardation appreciated  - Behavior: anxious, cooperative, maintains eye contact  Speech:  - Character/Organization: spontaneous, fluent, normal volume, normal rate, normal rhythm  Emotional State:  - Mood: "anxious"   - Affect: congruent and anxious  Thought:  - Process: logical, linear, organized , goal-directed  - Preoccupations: no ruminations, rituals, or phobias appreciated  - Delusions: no persecutory, paranoid, or grandiose delusions appreciated  - Perception: denies AVH, not actively responding to internal stimuli  - SI/HI: denies/denies  Sensorium & Intellect:  - Sensorium: AAOx4  - Memory: intact to recent and remote events  - Attention/Concentration: good/good  - Insight/Judgement: good/good    Gait: normal swing and stance  MSK:no rigidity appreciated    All other systems without acute issues unless noted in HPI      Assessment/Plan      ICD-10-CM ICD-9-CM    1. PTSD (post-traumatic stress disorder)  F43.10 309.81 sertraline (ZOLOFT) 100 MG tablet      2. Recurrent major depressive disorder, remission status unspecified  F33.9 296.30 sertraline (ZOLOFT) 100 MG tablet      3. Generalized anxiety disorder  F41.1 300.02 clonazePAM (KLONOPIN) 0.5 MG tablet      hydrOXYzine (ATARAX) 50 MG tablet      4. Insomnia, unspecified type  G47.00 780.52 hydrOXYzine (ATARAX) 50 MG tablet         Continue current medications without change    Potential side effects and risks vs benefits of current treatment plan reviewed with patient. Applicable black box warnings reviewed. Encouraged patient not to alter dosages or abruptly discontinue medications without contacting prescriber first, due to risk of worsening symptoms and " decompensation of mental status. Warned of risks associated with herbal remedies and supplements while taking psychotropic medications and of the need to consult prescriber prior to adding any of these to current regimen. Patient should abstain from abuse of alcohol, prescription medications, and illicit drugs. Reviewed when to contact clinic and/or seek emergent care, such as but not limited to, onset/worsening SI/HI, hallucinations, delusions, manic symptoms. Pt verbalized understanding and agreement of these warnings/recommendations and verbally consented to treatment plan.     checked. Results as expected. No suspected diversion or abuse of controlled substances.      Follow up in about 4 weeks (around 2/8/2024) for Medication Management.    Bahman Zabala, RONP

## 2024-03-04 ENCOUNTER — OFFICE VISIT (OUTPATIENT)
Dept: BEHAVIORAL HEALTH | Facility: CLINIC | Age: 29
End: 2024-03-04
Payer: COMMERCIAL

## 2024-03-04 VITALS — WEIGHT: 160.06 LBS | HEIGHT: 58 IN | BODY MASS INDEX: 33.6 KG/M2

## 2024-03-04 DIAGNOSIS — F33.9 RECURRENT MAJOR DEPRESSIVE DISORDER, REMISSION STATUS UNSPECIFIED: ICD-10-CM

## 2024-03-04 DIAGNOSIS — F41.1 GENERALIZED ANXIETY DISORDER: ICD-10-CM

## 2024-03-04 DIAGNOSIS — G47.00 INSOMNIA, UNSPECIFIED TYPE: ICD-10-CM

## 2024-03-04 DIAGNOSIS — F43.10 PTSD (POST-TRAUMATIC STRESS DISORDER): Primary | ICD-10-CM

## 2024-03-04 PROCEDURE — 99214 OFFICE O/P EST MOD 30 MIN: CPT | Mod: 95,,, | Performed by: NURSE PRACTITIONER

## 2024-03-04 PROCEDURE — 3008F BODY MASS INDEX DOCD: CPT | Mod: CPTII,95,, | Performed by: NURSE PRACTITIONER

## 2024-03-04 PROCEDURE — 1159F MED LIST DOCD IN RCRD: CPT | Mod: CPTII,95,, | Performed by: NURSE PRACTITIONER

## 2024-03-04 PROCEDURE — 1160F RVW MEDS BY RX/DR IN RCRD: CPT | Mod: CPTII,95,, | Performed by: NURSE PRACTITIONER

## 2024-03-04 RX ORDER — CLONAZEPAM 0.5 MG/1
0.5 TABLET ORAL DAILY PRN
Qty: 30 TABLET | Refills: 1 | Status: SHIPPED | OUTPATIENT
Start: 2024-03-04 | End: 2024-05-07 | Stop reason: SDUPTHER

## 2024-03-04 RX ORDER — HYDROXYZINE HYDROCHLORIDE 50 MG/1
75 TABLET, FILM COATED ORAL DAILY PRN
Qty: 45 TABLET | Refills: 1 | Status: SHIPPED | OUTPATIENT
Start: 2024-03-04 | End: 2024-05-07 | Stop reason: SDUPTHER

## 2024-03-04 RX ORDER — SERTRALINE HYDROCHLORIDE 100 MG/1
150 TABLET, FILM COATED ORAL NIGHTLY
Qty: 45 TABLET | Refills: 1 | Status: SHIPPED | OUTPATIENT
Start: 2024-03-04 | End: 2024-05-07 | Stop reason: SDUPTHER

## 2024-03-04 NOTE — PROGRESS NOTES
"PSYCHIATRIC FOLLOW-UP VISIT NOTE    Chief Complaint   Patient presents with    Medication Refill     Medication management         History of Present Illness  28 y.o. year old Black or  female with hx of PTSD, DALE, MDD, and insomnia seen today for follow-up appointment and medication management.  Patient reports that she has been doing well since last visit.  Denies any recent depression.  Mood has been good and she describes it as happy most days of the week.  Still some mild anxiety couple of days per week but reports good efficacy with p.r.n. clonazepam.  Feels she is fostered better close relationships since our last visit and this has been beneficial for her mental health.  Has good social support at present.  Spends much of her free time with friends and family.  Has not been isolative.  Sleeping well most nights and feels rested in the morning.  Denies any side effects from current medication regimen. Patient denies SI/HI. Denies hallucinations and does not appear to be responding to internal stimuli or be internally preoccupied. No manic symptoms noted.       Objective:     Vitals:  Vitals:    03/04/24 1422   Weight: 72.6 kg (160 lb 0.9 oz)   Height: 4' 9.99" (1.473 m)       Wt Readings from Last 3 Encounters:   03/04/24 1422 72.6 kg (160 lb 0.9 oz)   01/11/24 1515 72.6 kg (160 lb 0.9 oz)   10/18/23 1530 71.3 kg (157 lb 3.2 oz)         Medication:    Current Outpatient Medications:     ibuprofen (ADVIL,MOTRIN) 800 MG tablet, Take 800 mg by mouth every 6 (six) hours as needed., Disp: , Rfl:     levonorgestreL (KYLEENA) 17.5 mcg/24 hrs (5 yrs) 19.5 mg IUD, 19.5 mg by Intrauterine route As instructed. Every 5 years, Disp: , Rfl:     valACYclovir (VALTREX) 1000 MG tablet, Take 1 tablet (1,000 mg total) by mouth 2 (two) times daily as needed (as needed)., Disp: 6 tablet, Rfl: 6    clonazePAM (KLONOPIN) 0.5 MG tablet, Take 1 tablet (0.5 mg total) by mouth daily as needed for Anxiety., Disp: 30 " tablet, Rfl: 1    fluconazole (DIFLUCAN) 100 MG tablet, 1 tablet po now, then 1 tablet po in x3 days (Patient not taking: Reported on 3/4/2024), Disp: 2 tablet, Rfl: 0    hydrOXYzine (ATARAX) 50 MG tablet, Take 1.5 tablets (75 mg total) by mouth daily as needed for Anxiety., Disp: 45 tablet, Rfl: 1    sertraline (ZOLOFT) 100 MG tablet, Take 1.5 tablets (150 mg total) by mouth every evening., Disp: 45 tablet, Rfl: 1  No current facility-administered medications for this visit.    Facility-Administered Medications Ordered in Other Visits:     lactated ringers infusion, , Intravenous, Continuous, Baudoin, Fernando E, DO, Last Rate: 10 mL/hr at 06/30/22 1252, New Bag at 06/30/22 1252    sodium chloride 0.9% flush 10 mL, 10 mL, Intravenous, PRN, Baudoin, Fernando E, DO    sodium chloride 0.9% flush 10 mL, 10 mL, Intravenous, PRN, Baudoin, Fernando E, DO       Significant Labs: - none at this time    Significant Imaging: - none at this time    Physical Exam  Vitals and nursing note reviewed.   Constitutional:       General: She is awake.      Appearance: Normal appearance.   Musculoskeletal:      Comments: deferred   Neurological:      Mental Status: She is alert.   Psychiatric:         Attention and Perception: Attention and perception normal. She does not perceive auditory or visual hallucinations.         Mood and Affect: Affect normal.         Speech: Speech normal.         Behavior: Behavior is cooperative.         Thought Content: Thought content does not include homicidal or suicidal ideation.         Cognition and Memory: Cognition and memory normal.          Review of Systems     Mental Status Exam:  Presentation:  - Appearance: 28 y.o. year old Black or  female, appears stated age, appears Casually dressed and Well groomed  - Motility: No EPS or Tremors, No psychomotor agitation or retardation appreciated  - Behavior: calm, cooperative, good eye contact  Speech:  - Character/Organization: spontaneous,  "fluent, normal volume, normal rate, normal rhythm  Emotional State:  - Mood: "happy"   - Affect: congruent and appropriate  Thought:  - Process: logical, linear, organized , goal-directed  - Preoccupations: no ruminations, rituals, or phobias appreciated  - Delusions: no persecutory, paranoid, or grandiose delusions appreciated  - Perception: denies AVH, not actively responding to internal stimuli  - SI/HI: denies/denies  Sensorium & Intellect:  - Sensorium: AAOx4  - Memory: intact to recent and remote events  - Attention/Concentration: good/good  - Insight/Judgement: good/good    Gait: deferred   MSK:deferred     All other systems without acute issues unless noted in HPI      Assessment/Plan      ICD-10-CM ICD-9-CM    1. PTSD (post-traumatic stress disorder)  F43.10 309.81 sertraline (ZOLOFT) 100 MG tablet      2. Recurrent major depressive disorder, remission status unspecified  F33.9 296.30 sertraline (ZOLOFT) 100 MG tablet      3. Generalized anxiety disorder  F41.1 300.02 clonazePAM (KLONOPIN) 0.5 MG tablet      hydrOXYzine (ATARAX) 50 MG tablet      4. Insomnia, unspecified type  G47.00 780.52 hydrOXYzine (ATARAX) 50 MG tablet         Continue current medications without change    Potential side effects and risks vs benefits of current treatment plan reviewed with patient. Applicable black box warnings reviewed. Encouraged patient not to alter dosages or abruptly discontinue medications without contacting prescriber first, due to risk of worsening symptoms and decompensation of mental status. Warned of risks associated with herbal remedies and supplements while taking psychotropic medications and of the need to consult prescriber prior to adding any of these to current regimen. Patient should abstain from abuse of alcohol, prescription medications, and illicit drugs. Reviewed when to contact clinic and/or seek emergent care, such as but not limited to, onset/worsening SI/HI, hallucinations, delusions, manic " symptoms. Pt verbalized understanding and agreement of these warnings/recommendations and verbally consented to treatment plan.     checked. Results as expected. No suspected diversion or abuse of controlled substances.    The patient was informed of the restrictions of face-to-face healthcare visits. The patient verbally consented to proceed with a telemedicine visit, following discussion of the options of face-to-face or telemedicine visits. The telemedicine visit was completed using EPIC Video call without complication. The visit was conducted with limited physical exam and was medically appropriate to meet the patient's needs.         Follow up in about 2 months (around 5/4/2024) for Medication Management.    KEREN Charles

## 2024-05-07 ENCOUNTER — OFFICE VISIT (OUTPATIENT)
Dept: BEHAVIORAL HEALTH | Facility: CLINIC | Age: 29
End: 2024-05-07
Payer: COMMERCIAL

## 2024-05-07 VITALS — HEIGHT: 57 IN | WEIGHT: 156 LBS | BODY MASS INDEX: 33.66 KG/M2

## 2024-05-07 DIAGNOSIS — F43.10 PTSD (POST-TRAUMATIC STRESS DISORDER): Primary | ICD-10-CM

## 2024-05-07 DIAGNOSIS — G47.00 INSOMNIA, UNSPECIFIED TYPE: ICD-10-CM

## 2024-05-07 DIAGNOSIS — F33.9 RECURRENT MAJOR DEPRESSIVE DISORDER, REMISSION STATUS UNSPECIFIED: ICD-10-CM

## 2024-05-07 DIAGNOSIS — F41.1 GENERALIZED ANXIETY DISORDER: ICD-10-CM

## 2024-05-07 PROCEDURE — 3008F BODY MASS INDEX DOCD: CPT | Mod: CPTII,95,, | Performed by: NURSE PRACTITIONER

## 2024-05-07 PROCEDURE — 99214 OFFICE O/P EST MOD 30 MIN: CPT | Mod: 95,,, | Performed by: NURSE PRACTITIONER

## 2024-05-07 PROCEDURE — 1159F MED LIST DOCD IN RCRD: CPT | Mod: CPTII,95,, | Performed by: NURSE PRACTITIONER

## 2024-05-07 PROCEDURE — 1160F RVW MEDS BY RX/DR IN RCRD: CPT | Mod: CPTII,95,, | Performed by: NURSE PRACTITIONER

## 2024-05-07 RX ORDER — HYDROXYZINE HYDROCHLORIDE 50 MG/1
75 TABLET, FILM COATED ORAL DAILY PRN
Qty: 45 TABLET | Refills: 1 | Status: SHIPPED | OUTPATIENT
Start: 2024-05-07

## 2024-05-07 RX ORDER — SERTRALINE HYDROCHLORIDE 100 MG/1
150 TABLET, FILM COATED ORAL NIGHTLY
Qty: 45 TABLET | Refills: 1 | Status: SHIPPED | OUTPATIENT
Start: 2024-05-07 | End: 2024-06-03 | Stop reason: SDUPTHER

## 2024-05-07 RX ORDER — CLONAZEPAM 0.5 MG/1
0.5 TABLET ORAL DAILY PRN
Qty: 30 TABLET | Refills: 1 | Status: SHIPPED | OUTPATIENT
Start: 2024-05-07

## 2024-05-07 NOTE — PROGRESS NOTES
"PSYCHIATRIC FOLLOW-UP VISIT NOTE    Chief Complaint   Patient presents with    Follow-up     F/U-"I have no complaints."         History of Present Illness  28 y.o. year old Black or  female with hx of PTSD, MDD, DALE, and insomnia seen today for follow-up appointment and medication management.  Patient reports that she has been doing very well since our last visit.  Denies any recent depression.  Describes mood is happy most days of the week.  She was have some occasional normative stress at work but states she is able to leave those feelings at work and enjoy the rest of her day once a shift is over.  Moved into a new home in early April and she and her family are very happy with the current living situation.  No recent nightmares or flashbacks.  No anhedonia or isolative behaviors.  She is sleeping well at night and feels rested in the morning.  Denies any side effects from current medication regimen.    Objective:     Vitals:  Vitals:    05/07/24 1523   Weight: 70.8 kg (156 lb)   Height: 4' 9" (1.448 m)       Wt Readings from Last 3 Encounters:   05/07/24 1523 70.8 kg (156 lb)   03/04/24 1422 72.6 kg (160 lb 0.9 oz)   01/11/24 1515 72.6 kg (160 lb 0.9 oz)         Medication:    Current Outpatient Medications:     ibuprofen (ADVIL,MOTRIN) 800 MG tablet, Take 800 mg by mouth every 6 (six) hours as needed., Disp: , Rfl:     levonorgestreL (KYLEENA) 17.5 mcg/24 hrs (5 yrs) 19.5 mg IUD, 19.5 mg by Intrauterine route As instructed. Every 5 years, Disp: , Rfl:     valACYclovir (VALTREX) 1000 MG tablet, Take 1 tablet (1,000 mg total) by mouth 2 (two) times daily as needed (as needed)., Disp: 6 tablet, Rfl: 6    clonazePAM (KLONOPIN) 0.5 MG tablet, Take 1 tablet (0.5 mg total) by mouth daily as needed for Anxiety., Disp: 30 tablet, Rfl: 1    hydrOXYzine (ATARAX) 50 MG tablet, Take 1.5 tablets (75 mg total) by mouth daily as needed for Anxiety., Disp: 45 tablet, Rfl: 1    sertraline (ZOLOFT) 100 MG tablet, " "Take 1.5 tablets (150 mg total) by mouth every evening., Disp: 45 tablet, Rfl: 1  No current facility-administered medications for this visit.    Facility-Administered Medications Ordered in Other Visits:     lactated ringers infusion, , Intravenous, Continuous, Baudoin, Fernando E, DO, Last Rate: 10 mL/hr at 06/30/22 1252, New Bag at 06/30/22 1252    sodium chloride 0.9% flush 10 mL, 10 mL, Intravenous, PRN, Baudoin, Fernando E, DO    sodium chloride 0.9% flush 10 mL, 10 mL, Intravenous, PRN, Baudoin, Fernando E, DO       Significant Labs: - none at this time    Significant Imaging: - none at this time    Physical Exam  Vitals and nursing note reviewed.   Constitutional:       General: She is awake.      Appearance: Normal appearance.   Musculoskeletal:      Comments: deferred   Neurological:      Mental Status: She is alert.   Psychiatric:         Attention and Perception: Attention and perception normal. She does not perceive auditory or visual hallucinations.         Mood and Affect: Affect normal.         Speech: Speech normal.         Behavior: Behavior is cooperative.         Thought Content: Thought content does not include homicidal or suicidal ideation.         Cognition and Memory: Cognition and memory normal.          Review of Systems     Mental Status Exam:  Presentation:  - Appearance: 28 y.o. year old Black or  female, appears stated age, appears Casually dressed and Well groomed  - Motility: No EPS or Tremors, No psychomotor agitation or retardation appreciated  - Behavior: calm, cooperative, good eye contact  Speech:  - Character/Organization: spontaneous, fluent, normal volume, normal rate, normal rhythm  Emotional State:  - Mood: "happy"   - Affect: congruent and appropriate  Thought:  - Process: logical, linear, organized , goal-directed  - Preoccupations: no ruminations, rituals, or phobias appreciated  - Delusions: no persecutory, paranoid, or grandiose delusions appreciated  - " Perception: denies AVH, not actively responding to internal stimuli  - SI/HI: denies/denies  Sensorium & Intellect:  - Sensorium: AAOx4  - Memory: intact to recent and remote events  - Attention/Concentration: good/good  - Insight/Judgement: good/good    Gait: deferred   MSK:deferred     All other systems without acute issues unless noted in HPI      Assessment/Plan      ICD-10-CM ICD-9-CM    1. PTSD (post-traumatic stress disorder)  F43.10 309.81 sertraline (ZOLOFT) 100 MG tablet      2. Recurrent major depressive disorder, remission status unspecified  F33.9 296.30 sertraline (ZOLOFT) 100 MG tablet      3. Generalized anxiety disorder  F41.1 300.02 hydrOXYzine (ATARAX) 50 MG tablet      clonazePAM (KLONOPIN) 0.5 MG tablet      4. Insomnia, unspecified type  G47.00 780.52 hydrOXYzine (ATARAX) 50 MG tablet         Continue current medications without change    Potential side effects and risks vs benefits of current treatment plan reviewed with patient. Applicable black box warnings reviewed. Encouraged patient not to alter dosages or abruptly discontinue medications without contacting prescriber first, due to risk of worsening symptoms and decompensation of mental status. Warned of risks associated with herbal remedies and supplements while taking psychotropic medications and of the need to consult prescriber prior to adding any of these to current regimen. Patient should abstain from abuse of alcohol, prescription medications, and illicit drugs. Reviewed when to contact clinic and/or seek emergent care, such as but not limited to, onset/worsening SI/HI, hallucinations, delusions, manic symptoms. Pt verbalized understanding and agreement of these warnings/recommendations and verbally consented to treatment plan.    The patient was informed of the restrictions of face-to-face healthcare visits. The patient verbally consented to proceed with a telemedicine visit, following discussion of the options of face-to-face or  telemedicine visits. The telemedicine visit was completed using EPIC Video call without complication. The visit was conducted with limited physical exam and was medically appropriate to meet the patient's needs. Duration of call: 6 minutes      Follow up in about 3 months (around 8/7/2024) for Medication Management.    KEREN Charles

## 2024-06-03 DIAGNOSIS — F43.10 PTSD (POST-TRAUMATIC STRESS DISORDER): ICD-10-CM

## 2024-06-03 DIAGNOSIS — F33.9 RECURRENT MAJOR DEPRESSIVE DISORDER, REMISSION STATUS UNSPECIFIED: ICD-10-CM

## 2024-06-03 RX ORDER — SERTRALINE HYDROCHLORIDE 100 MG/1
150 TABLET, FILM COATED ORAL NIGHTLY
Qty: 45 TABLET | Refills: 1 | Status: SHIPPED | OUTPATIENT
Start: 2024-06-03

## 2024-06-18 ENCOUNTER — OFFICE VISIT (OUTPATIENT)
Dept: FAMILY MEDICINE | Facility: CLINIC | Age: 29
End: 2024-06-18
Payer: COMMERCIAL

## 2024-06-18 VITALS
SYSTOLIC BLOOD PRESSURE: 110 MMHG | HEIGHT: 57 IN | WEIGHT: 165 LBS | DIASTOLIC BLOOD PRESSURE: 72 MMHG | TEMPERATURE: 98 F | OXYGEN SATURATION: 98 % | BODY MASS INDEX: 35.6 KG/M2 | HEART RATE: 103 BPM

## 2024-06-18 DIAGNOSIS — G56.02 CARPAL TUNNEL SYNDROME OF LEFT WRIST: ICD-10-CM

## 2024-06-18 DIAGNOSIS — B35.9 RINGWORM: Primary | ICD-10-CM

## 2024-06-18 PROCEDURE — 3008F BODY MASS INDEX DOCD: CPT | Mod: CPTII,,, | Performed by: NURSE PRACTITIONER

## 2024-06-18 PROCEDURE — 99214 OFFICE O/P EST MOD 30 MIN: CPT | Mod: ,,, | Performed by: NURSE PRACTITIONER

## 2024-06-18 PROCEDURE — 1159F MED LIST DOCD IN RCRD: CPT | Mod: CPTII,,, | Performed by: NURSE PRACTITIONER

## 2024-06-18 PROCEDURE — 3078F DIAST BP <80 MM HG: CPT | Mod: CPTII,,, | Performed by: NURSE PRACTITIONER

## 2024-06-18 PROCEDURE — 1160F RVW MEDS BY RX/DR IN RCRD: CPT | Mod: CPTII,,, | Performed by: NURSE PRACTITIONER

## 2024-06-18 PROCEDURE — 3074F SYST BP LT 130 MM HG: CPT | Mod: CPTII,,, | Performed by: NURSE PRACTITIONER

## 2024-06-18 RX ORDER — IBUPROFEN 800 MG/1
800 TABLET ORAL EVERY 6 HOURS PRN
Qty: 30 TABLET | Refills: 0 | Status: SHIPPED | OUTPATIENT
Start: 2024-06-18

## 2024-06-18 RX ORDER — CLOTRIMAZOLE AND BETAMETHASONE DIPROPIONATE 10; .64 MG/G; MG/G
CREAM TOPICAL 2 TIMES DAILY
Qty: 15 G | Refills: 1 | Status: SHIPPED | OUTPATIENT
Start: 2024-06-18

## 2024-06-18 NOTE — PROGRESS NOTES
"Patient ID: Anahi Reza  : 1995    Chief Complaint: skin infection and Arm Pain    Allergies: Patient has No Known Allergies.     History of Present Illness:  The patient is a 28 y.o. Black or  female who presents to clinic for follow up on skin infection and Arm Pain     Complains of rash. She has one single lesion to the left forarm, ulnar side. It is intensely itchy, well defined borders. Denies yellow crusting, denies other lesions on body.  Her son had similar looking lesions on his scalp recently and was treated for fungal infection.    Over the last month she has had pain to the left arm; it seems to start at the left wrist and some days it travels up the arm. She also notices that she wakes up at night with pain occasionally. 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     Social History:  reports that she has never smoked. She has never been exposed to tobacco smoke. She has never used smokeless tobacco. She reports current alcohol use of about 2.0 standard drinks of alcohol per week. She reports that she does not use drugs.    Past Medical History:  has a past medical history of Anxiety, Depression, Herpes simplex virus (HSV) infection, Prediabetes, and [place] teeth removed.    Current Medications:  Current Outpatient Medications   Medication Instructions    clonazePAM (KLONOPIN) 0.5 mg, Oral, Daily PRN    clotrimazole-betamethasone 1-0.05% (LOTRISONE) cream Topical (Top), 2 times daily    hydrOXYzine (ATARAX) 75 mg, Oral, Daily PRN    ibuprofen (ADVIL,MOTRIN) 800 mg, Oral, Every 6 hours PRN    KYLEENA 19.5 mg, Intrauterine, See admin instructions, Every 5 years    sertraline (ZOLOFT) 150 mg, Oral, Nightly    valACYclovir (VALTREX) 1,000 mg, Oral, 2 times daily PRN       Review of Systems   See hPI    Visit Vitals  /72 (BP Location: Left arm)   Pulse 103   Temp 98.1 °F (36.7 °C) (Temporal)   Ht 4' 9" (1.448 m)   Wt 74.8 kg (165 lb)   LMP 2024   SpO2 98%   BMI 35.71 kg/m² "       Physical Exam  Vitals reviewed.   Constitutional:       Appearance: Normal appearance.   Eyes:      Conjunctiva/sclera: Conjunctivae normal.   Cardiovascular:      Heart sounds: Normal heart sounds.   Pulmonary:      Breath sounds: Normal breath sounds.   Musculoskeletal:      Right wrist: Normal.      Left wrist: Normal pulse.      Comments: +Phalen's sign left wrist   Skin:     General: Skin is warm and dry.      Comments: Small round erythematous lesion with well-defined borders, approximately 1 cm around 2 ulnar aspect of distal left arm   Neurological:      Mental Status: She is oriented to person, place, and time.            Assessment & Plan:  1. Ringworm  Comments:  Apply Lotrisone b.i.d., advise to continue medication for a few days after rash disappears.  Orders:  -     clotrimazole-betamethasone 1-0.05% (LOTRISONE) cream; Apply topically 2 (two) times daily.  Dispense: 15 g; Refill: 1    2. Carpal tunnel syndrome of left wrist  Comments:  Take ibuprofen Q 8, get a carpal tunnel splint and wear it around the clock.  Apply ice for 15-20 minutes at a time several times daily.  Follow-up 2 weeks  Orders:  -     ibuprofen (ADVIL,MOTRIN) 800 MG tablet; Take 1 tablet (800 mg total) by mouth every 6 (six) hours as needed for Pain.  Dispense: 30 tablet; Refill: 0         Future Appointments   Date Time Provider Department Center   6/25/2024  3:30 PM Tisha Ramirez NP [facility]   7/1/2024  3:00 PM [facility]   7/25/2024  9:00 AM LAB, [facility] LABORATORY DRAW STATION [facility] FARHEEN [facility]   7/31/2024  1:00 PM [facility]   8/1/2024  3:30 PM [facility]       Follow up for 2 wk f/u on ringworm and cts. Call sooner if needed.    BJ Hollis-C    Lab Frequency Next Occurrence   CBC Auto Differential Once 07/11/2023   Comprehensive Metabolic Panel Once 07/11/2023   Lipid  Panel Once 07/11/2023   TSH Once 07/11/2023   Hemoglobin A1C Once 07/11/2023   Ambulatory referral/consult to Sleep Disorders Once 07/20/2023

## 2024-06-25 ENCOUNTER — OFFICE VISIT (OUTPATIENT)
Dept: OBSTETRICS AND GYNECOLOGY | Facility: CLINIC | Age: 29
End: 2024-06-25
Payer: COMMERCIAL

## 2024-06-25 VITALS
WEIGHT: 166.88 LBS | SYSTOLIC BLOOD PRESSURE: 124 MMHG | DIASTOLIC BLOOD PRESSURE: 64 MMHG | HEIGHT: 57 IN | BODY MASS INDEX: 36 KG/M2 | TEMPERATURE: 99 F

## 2024-06-25 DIAGNOSIS — Z12.4 CERVICAL CANCER SCREENING: ICD-10-CM

## 2024-06-25 DIAGNOSIS — Z01.419 NORMAL GYNECOLOGIC EXAMINATION: Primary | ICD-10-CM

## 2024-06-25 DIAGNOSIS — Z80.3 FAMILY HISTORY OF BREAST CANCER: ICD-10-CM

## 2024-06-25 DIAGNOSIS — Z30.431 INTRAUTERINE DEVICE SURVEILLANCE: ICD-10-CM

## 2024-06-25 PROCEDURE — 3074F SYST BP LT 130 MM HG: CPT | Mod: CPTII,,, | Performed by: NURSE PRACTITIONER

## 2024-06-25 PROCEDURE — 3078F DIAST BP <80 MM HG: CPT | Mod: CPTII,,, | Performed by: NURSE PRACTITIONER

## 2024-06-25 PROCEDURE — 3008F BODY MASS INDEX DOCD: CPT | Mod: CPTII,,, | Performed by: NURSE PRACTITIONER

## 2024-06-25 PROCEDURE — 1160F RVW MEDS BY RX/DR IN RCRD: CPT | Mod: CPTII,,, | Performed by: NURSE PRACTITIONER

## 2024-06-25 PROCEDURE — 99395 PREV VISIT EST AGE 18-39: CPT | Mod: ,,, | Performed by: NURSE PRACTITIONER

## 2024-06-25 PROCEDURE — 1159F MED LIST DOCD IN RCRD: CPT | Mod: CPTII,,, | Performed by: NURSE PRACTITIONER
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
## 2024-07-01 ENCOUNTER — OFFICE VISIT (OUTPATIENT)
Dept: FAMILY MEDICINE | Facility: CLINIC | Age: 29
End: 2024-07-01
Payer: COMMERCIAL

## 2024-07-01 VITALS
TEMPERATURE: 98 F | DIASTOLIC BLOOD PRESSURE: 68 MMHG | SYSTOLIC BLOOD PRESSURE: 116 MMHG | HEART RATE: 102 BPM | BODY MASS INDEX: 35.38 KG/M2 | HEIGHT: 57 IN | OXYGEN SATURATION: 96 % | WEIGHT: 164 LBS

## 2024-07-01 DIAGNOSIS — B35.9 RINGWORM: ICD-10-CM

## 2024-07-01 DIAGNOSIS — G56.02 CARPAL TUNNEL SYNDROME OF LEFT WRIST: Primary | ICD-10-CM

## 2024-07-01 PROCEDURE — 1159F MED LIST DOCD IN RCRD: CPT | Mod: CPTII,,, | Performed by: NURSE PRACTITIONER

## 2024-07-01 PROCEDURE — 3074F SYST BP LT 130 MM HG: CPT | Mod: CPTII,,, | Performed by: NURSE PRACTITIONER

## 2024-07-01 PROCEDURE — 1160F RVW MEDS BY RX/DR IN RCRD: CPT | Mod: CPTII,,, | Performed by: NURSE PRACTITIONER

## 2024-07-01 PROCEDURE — 99214 OFFICE O/P EST MOD 30 MIN: CPT | Mod: ,,, | Performed by: NURSE PRACTITIONER

## 2024-07-01 PROCEDURE — 3008F BODY MASS INDEX DOCD: CPT | Mod: CPTII,,, | Performed by: NURSE PRACTITIONER

## 2024-07-01 PROCEDURE — 3078F DIAST BP <80 MM HG: CPT | Mod: CPTII,,, | Performed by: NURSE PRACTITIONER

## 2024-07-01 RX ORDER — PREDNISONE 20 MG/1
TABLET ORAL
Qty: 8 TABLET | Refills: 0 | Status: SHIPPED | OUTPATIENT
Start: 2024-07-01

## 2024-07-01 NOTE — ASSESSMENT & PLAN NOTE
Continue clotrimazole betamethasone for another week.  If no improvement after another 7 days, will treat with oral antifungal.

## 2024-07-01 NOTE — PROGRESS NOTES
"  Patient ID: [id number]     Chief Complaint: Recurrent Skin Infections (2 week follow up)      HPI:     Anahi Reza is a 28 y.o. female in the office for Recurrent Skin Infections (2 week follow up)  Rash fading, nearly gone.  No longer itching. Left wrist, thumb, index finger still painful, weak.       Past Medical History:  has a past medical history of Anxiety, Depression, Herpes simplex virus (HSV) infection, Prediabetes, and [place] teeth removed.    Social History:  reports that she has never smoked. She has never been exposed to tobacco smoke. She has never used smokeless tobacco. She reports current alcohol use of about 2.0 standard drinks of alcohol per week. She reports that she does not use drugs.    Current Outpatient Medications   Medication Instructions    clonazePAM (KLONOPIN) 0.5 mg, Oral, Daily PRN    clotrimazole-betamethasone 1-0.05% (LOTRISONE) cream Topical (Top), 2 times daily    hydrOXYzine (ATARAX) 75 mg, Oral, Daily PRN    ibuprofen (ADVIL,MOTRIN) 800 mg, Oral, Every 6 hours PRN    KYLEENA 19.5 mg, Intrauterine, See admin instructions, Every 5 years    predniSONE (DELTASONE) 20 MG tablet One tablet orally twice daily for 3 days and then once daily for 2 days    sertraline (ZOLOFT) 150 mg, Oral, Nightly    valACYclovir (VALTREX) 1,000 mg, Oral, 2 times daily PRN       Patient has No Known Allergies.     Patient Care Team:  Dinorah Sanchez FNP-C as PCP - General (Family Medicine)  Bahman Zabala PMHNP as Nurse Practitioner (Psychiatry)  Tisha Ramirez NP as Nurse Practitioner (Obstetrics and Gynecology)  Roger Valderrama     Subjective     Review of Systems    See HPI     Objective     Visit Vitals  /68 (BP Location: Left arm, Patient Position: Sitting, BP Method: Medium (Manual))   Pulse 102   Temp 98.4 °F (36.9 °C) (Oral)   Ht 4' 9.01" (1.448 m)   Wt 74.4 kg (164 lb)   LMP 06/11/2024 (Exact Date)   SpO2 96%   BMI 35.48 kg/m²       Physical Exam  Vitals " reviewed.   Constitutional:       Appearance: Normal appearance.   Eyes:      Conjunctiva/sclera: Conjunctivae normal.   Cardiovascular:      Heart sounds: Normal heart sounds.   Pulmonary:      Breath sounds: Normal breath sounds.   Musculoskeletal:      Right wrist: Normal.      Left wrist: Normal pulse.      Comments: +Phalen's sign left wrist   Skin:     General: Skin is warm and dry.      Comments: Small round erythematous lesion with well-defined borders, approximately 1 cm around 2 ulnar aspect of distal left arm   Neurological:      Mental Status: She is oriented to person, place, and time.         Assessment & Plan:     1. Carpal tunnel syndrome of left wrist  Assessment & Plan:  Failed ibuprofen, splint.  Start prednisone burst, ortho referral for injection.     Orders:  -     predniSONE (DELTASONE) 20 MG tablet; One tablet orally twice daily for 3 days and then once daily for 2 days  Dispense: 8 tablet; Refill: 0  -     Ambulatory referral/consult to Orthopedics; Future; Expected date: 07/08/2024    2. Ringworm  Assessment & Plan:  Continue clotrimazole betamethasone for another week.  If no improvement after another 7 days, will treat with oral antifungal.            Follow up if symptoms worsen or fail to improve. In addition to their next scheduled appointment, the patient has also been instructed to follow up on as needed basis.     Future Appointments   Date Time Provider Department Center   7/25/2024  9:00 AM LAB, [facility] LABORATORY DRAW STATION [facility] FARHEEN [facility]   7/31/2024  1:00 PM Dinor[facility]   8/1/2024  3:30 PM Bahman Zabala [facility]   6/30/2025  3:30 PM Tisha Ramirez NP [facility]

## 2024-07-03 ENCOUNTER — PATIENT MESSAGE (OUTPATIENT)
Dept: FAMILY MEDICINE | Facility: CLINIC | Age: 29
End: 2024-07-03
Payer: COMMERCIAL

## 2024-07-24 DIAGNOSIS — Z00.01 ENCOUNTER FOR ROUTINE ADULT HEALTH EXAMINATION WITH ABNORMAL FINDINGS: Primary | ICD-10-CM

## 2024-07-25 ENCOUNTER — LAB VISIT (OUTPATIENT)
Dept: LAB | Facility: HOSPITAL | Age: 29
End: 2024-07-25
Attending: STUDENT IN AN ORGANIZED HEALTH CARE EDUCATION/TRAINING PROGRAM
Payer: COMMERCIAL

## 2024-07-25 DIAGNOSIS — Z00.01 ENCOUNTER FOR ROUTINE ADULT HEALTH EXAMINATION WITH ABNORMAL FINDINGS: ICD-10-CM

## 2024-07-25 LAB
ALBUMIN SERPL-MCNC: 4.2 G/DL (ref 3.4–5)
ALBUMIN/GLOB SERPL: 1.4 RATIO
ALP SERPL-CCNC: 64 UNIT/L (ref 50–144)
ALT SERPL-CCNC: 19 UNIT/L (ref 1–45)
ANION GAP SERPL CALC-SCNC: 8 MEQ/L (ref 2–13)
AST SERPL-CCNC: 22 UNIT/L (ref 14–36)
BASOPHILS # BLD AUTO: 0.03 X10(3)/MCL (ref 0.01–0.08)
BASOPHILS NFR BLD AUTO: 0.4 % (ref 0.1–1.2)
BILIRUB SERPL-MCNC: 0.1 MG/DL (ref 0–1)
BUN SERPL-MCNC: 14 MG/DL (ref 7–20)
CALCIUM SERPL-MCNC: 9.9 MG/DL (ref 8.4–10.2)
CHLORIDE SERPL-SCNC: 107 MMOL/L (ref 98–110)
CHOLEST SERPL-MCNC: 197 MG/DL (ref 0–200)
CO2 SERPL-SCNC: 24 MMOL/L (ref 21–32)
CREAT SERPL-MCNC: 1.06 MG/DL (ref 0.66–1.25)
CREAT/UREA NIT SERPL: 13 (ref 12–20)
EOSINOPHIL # BLD AUTO: 0.1 X10(3)/MCL (ref 0.04–0.36)
EOSINOPHIL NFR BLD AUTO: 1.2 % (ref 0.7–7)
ERYTHROCYTE [DISTWIDTH] IN BLOOD BY AUTOMATED COUNT: 13.4 % (ref 11–14.5)
EST. AVERAGE GLUCOSE BLD GHB EST-MCNC: 151.3 MG/DL (ref 70–115)
GFR SERPLBLD CREATININE-BSD FMLA CKD-EPI: 74 ML/MIN/1.73/M2
GLOBULIN SER-MCNC: 2.9 GM/DL (ref 2–3.9)
GLUCOSE SERPL-MCNC: 147 MG/DL (ref 70–115)
HBA1C MFR BLD: 6.9 % (ref 4–6)
HCT VFR BLD AUTO: 37.7 % (ref 36–48)
HDLC SERPL-MCNC: 56 MG/DL (ref 40–60)
HGB BLD-MCNC: 12.6 G/DL (ref 11.8–16)
IMM GRANULOCYTES # BLD AUTO: 0.05 X10(3)/MCL (ref 0–0.03)
IMM GRANULOCYTES NFR BLD AUTO: 0.6 % (ref 0–0.5)
LDLC SERPL DIRECT ASSAY-SCNC: 108 MG/DL (ref 30–100)
LYMPHOCYTES # BLD AUTO: 2.27 X10(3)/MCL (ref 1.16–3.74)
LYMPHOCYTES NFR BLD AUTO: 27.1 % (ref 20–55)
MCH RBC QN AUTO: 27.5 PG (ref 27–34)
MCHC RBC AUTO-ENTMCNC: 33.4 G/DL (ref 31–37)
MCV RBC AUTO: 82.3 FL (ref 79–99)
MONOCYTES # BLD AUTO: 0.57 X10(3)/MCL (ref 0.24–0.36)
MONOCYTES NFR BLD AUTO: 6.8 % (ref 4.7–12.5)
NEUTROPHILS # BLD AUTO: 5.36 X10(3)/MCL (ref 1.56–6.13)
NEUTROPHILS NFR BLD AUTO: 63.9 % (ref 37–73)
NRBC BLD AUTO-RTO: 0 %
PLATELET # BLD AUTO: 301 X10(3)/MCL (ref 140–371)
PMV BLD AUTO: 9.1 FL (ref 9.4–12.4)
POTASSIUM SERPL-SCNC: 4.5 MMOL/L (ref 3.5–5.1)
PROT SERPL-MCNC: 7.1 GM/DL (ref 6.3–8.2)
RBC # BLD AUTO: 4.58 X10(6)/MCL (ref 4–5.1)
SODIUM SERPL-SCNC: 139 MMOL/L (ref 136–145)
TRIGL SERPL-MCNC: 166 MG/DL (ref 30–200)
TSH SERPL-ACNC: 1.98 UIU/ML (ref 0.36–3.74)
WBC # BLD AUTO: 8.38 X10(3)/MCL (ref 4–11.5)

## 2024-07-25 PROCEDURE — 80061 LIPID PANEL: CPT

## 2024-07-25 PROCEDURE — 83036 HEMOGLOBIN GLYCOSYLATED A1C: CPT

## 2024-07-25 PROCEDURE — 36415 COLL VENOUS BLD VENIPUNCTURE: CPT

## 2024-07-25 PROCEDURE — 85025 COMPLETE CBC W/AUTO DIFF WBC: CPT

## 2024-07-25 PROCEDURE — 84443 ASSAY THYROID STIM HORMONE: CPT

## 2024-07-25 PROCEDURE — 80053 COMPREHEN METABOLIC PANEL: CPT

## 2024-08-02 ENCOUNTER — TELEPHONE (OUTPATIENT)
Dept: FAMILY MEDICINE | Facility: CLINIC | Age: 29
End: 2024-08-02
Payer: COMMERCIAL

## 2024-08-02 NOTE — TELEPHONE ENCOUNTER
----- Message from BJ Thapa-TORI sent at 8/2/2024  1:39 PM CDT -----  Please notify patient that I'd like to discuss results in office to have adequate time for her to ask questions.  30M

## 2024-08-08 ENCOUNTER — PATIENT MESSAGE (OUTPATIENT)
Dept: FAMILY MEDICINE | Facility: CLINIC | Age: 29
End: 2024-08-08
Payer: COMMERCIAL

## 2024-08-21 ENCOUNTER — PATIENT MESSAGE (OUTPATIENT)
Dept: ADMINISTRATIVE | Facility: HOSPITAL | Age: 29
End: 2024-08-21
Payer: COMMERCIAL

## 2024-08-21 DIAGNOSIS — R73.03 PREDIABETES: Primary | ICD-10-CM

## 2024-08-28 ENCOUNTER — APPOINTMENT (OUTPATIENT)
Dept: LAB | Facility: HOSPITAL | Age: 29
End: 2024-08-28
Attending: NURSE PRACTITIONER
Payer: COMMERCIAL

## 2024-08-28 DIAGNOSIS — R73.03 PREDIABETES: Primary | ICD-10-CM

## 2024-08-28 LAB
CREAT UR-MCNC: 81.2 MG/DL (ref 45–106)
MICROALBUMIN UR-MCNC: 16.4 UG/ML
MICROALBUMIN/CREAT RATIO PNL UR: 20.2 MG/GM CR (ref 0–30)

## 2024-08-30 ENCOUNTER — HOSPITAL ENCOUNTER (OUTPATIENT)
Dept: RADIOLOGY | Facility: HOSPITAL | Age: 29
Discharge: HOME | End: 2024-08-30
Attending: SURGERY
Payer: COMMERCIAL

## 2024-08-30 DIAGNOSIS — K21.9 ESOPHAGEAL REFLUX: ICD-10-CM

## 2024-08-30 PROCEDURE — 76700 US EXAM ABDOM COMPLETE: CPT | Mod: TC

## 2024-09-03 ENCOUNTER — PATIENT OUTREACH (OUTPATIENT)
Facility: CLINIC | Age: 29
End: 2024-09-03
Payer: COMMERCIAL

## 2024-09-03 NOTE — PROGRESS NOTES
Health Maintenance Topic(s) Outreach Outcomes & Actions Taken:    .   Additional Notes:  Requested referral follow from [facility] for DM Eye Exam

## 2024-09-03 NOTE — LETTER
REFERRAL FOLLOW UP    Dear [facility] [place],    We are seeing Anahi Reza, date of birth 1995, in the clinic at [facility]. Dinorah Sanchez FNP-C is the patient's PCP. Anahi Reza has an outstanding referral to your office, it was sent 2024.                  ( APPT DATE )  _______________         Please fax records to [facility], Breaux, Jill A., FNP-C, [phone].            Patient Name: Anahi Reza  : 1995  Patient Phone #: [phone]

## 2024-09-06 ENCOUNTER — HOSPITAL ENCOUNTER (OUTPATIENT)
Dept: PREADMISSION TESTING | Facility: HOSPITAL | Age: 29
Discharge: HOME | End: 2024-09-06
Attending: SURGERY
Payer: COMMERCIAL

## 2024-09-06 VITALS — HEIGHT: 58 IN | WEIGHT: 162 LBS | BODY MASS INDEX: 34 KG/M2

## 2024-09-06 DIAGNOSIS — K29.00 ACUTE GASTRITIS: ICD-10-CM

## 2024-09-06 DIAGNOSIS — K21.9 GASTROESOPHAGEAL REFLUX DISEASE, UNSPECIFIED WHETHER ESOPHAGITIS PRESENT: Primary | ICD-10-CM

## 2024-09-06 RX ORDER — GLYCOPYRROLATE 0.2 MG/ML
0.2 INJECTION INTRAMUSCULAR; INTRAVENOUS ONCE
Status: CANCELLED | OUTPATIENT
Start: 2024-09-13

## 2024-09-06 RX ORDER — SODIUM CHLORIDE, SODIUM LACTATE, POTASSIUM CHLORIDE, CALCIUM CHLORIDE 600; 310; 30; 20 MG/100ML; MG/100ML; MG/100ML; MG/100ML
INJECTION, SOLUTION INTRAVENOUS CONTINUOUS
Status: CANCELLED | OUTPATIENT
Start: 2024-09-13

## 2024-09-06 NOTE — DISCHARGE INSTRUCTIONS

## 2024-09-09 ENCOUNTER — OFFICE VISIT (OUTPATIENT)
Dept: BEHAVIORAL HEALTH | Facility: CLINIC | Age: 29
End: 2024-09-09
Payer: COMMERCIAL

## 2024-09-09 VITALS — HEIGHT: 58 IN | BODY MASS INDEX: 34.02 KG/M2 | WEIGHT: 162.06 LBS

## 2024-09-09 DIAGNOSIS — F41.1 GENERALIZED ANXIETY DISORDER: ICD-10-CM

## 2024-09-09 DIAGNOSIS — F43.10 PTSD (POST-TRAUMATIC STRESS DISORDER): Primary | ICD-10-CM

## 2024-09-09 DIAGNOSIS — F33.9 RECURRENT MAJOR DEPRESSIVE DISORDER, REMISSION STATUS UNSPECIFIED: ICD-10-CM

## 2024-09-09 DIAGNOSIS — G47.00 INSOMNIA, UNSPECIFIED TYPE: ICD-10-CM

## 2024-09-09 PROCEDURE — 3061F NEG MICROALBUMINURIA REV: CPT | Mod: CPTII,95,, | Performed by: NURSE PRACTITIONER

## 2024-09-09 PROCEDURE — 1159F MED LIST DOCD IN RCRD: CPT | Mod: CPTII,95,, | Performed by: NURSE PRACTITIONER

## 2024-09-09 PROCEDURE — 3008F BODY MASS INDEX DOCD: CPT | Mod: CPTII,95,, | Performed by: NURSE PRACTITIONER

## 2024-09-09 PROCEDURE — 3044F HG A1C LEVEL LT 7.0%: CPT | Mod: CPTII,95,, | Performed by: NURSE PRACTITIONER

## 2024-09-09 PROCEDURE — 1160F RVW MEDS BY RX/DR IN RCRD: CPT | Mod: CPTII,95,, | Performed by: NURSE PRACTITIONER

## 2024-09-09 PROCEDURE — 3066F NEPHROPATHY DOC TX: CPT | Mod: CPTII,95,, | Performed by: NURSE PRACTITIONER

## 2024-09-09 PROCEDURE — 99214 OFFICE O/P EST MOD 30 MIN: CPT | Mod: 95,,, | Performed by: NURSE PRACTITIONER

## 2024-09-09 RX ORDER — HYDROXYZINE HYDROCHLORIDE 50 MG/1
75 TABLET, FILM COATED ORAL DAILY PRN
Qty: 45 TABLET | Refills: 2 | Status: SHIPPED | OUTPATIENT
Start: 2024-09-09

## 2024-09-09 RX ORDER — SERTRALINE HYDROCHLORIDE 100 MG/1
150 TABLET, FILM COATED ORAL NIGHTLY
Qty: 45 TABLET | Refills: 2 | Status: SHIPPED | OUTPATIENT
Start: 2024-09-09

## 2024-09-09 RX ORDER — CLONAZEPAM 0.5 MG/1
0.5 TABLET ORAL DAILY PRN
Qty: 30 TABLET | Refills: 2 | Status: SHIPPED | OUTPATIENT
Start: 2024-09-09

## 2024-09-09 NOTE — PROGRESS NOTES
"PSYCHIATRIC FOLLOW-UP VISIT NOTE    Chief Complaint   Patient presents with    Medication Management     Medication  management         History of Present Illness  28 y.o. year old Black or  female with hx of PTSD, MDD, DALE, and insomnia seen today for follow-up appointment and medication management.  Patient reports that she has been doing well since last visit.  Denies any recent depression and describes mood as happy most days of the week.  She does experience some increased situational anxiety in her workplace but reports anxiety is well-controlled outside of the setting.  Her family and children are doing well.  She is sleeping well at night without p.r.n. hydroxyzine.  Feels rested in the morning with no residual grogginess.  She does have an esophageal scope scheduled for this Friday due to increased acid reflux.  Otherwise no acute complaints today.  Denies any side effects from current medication regimen. Patient denies SI/HI. Denies hallucinations and does not appear to be responding to internal stimuli or be internally preoccupied. No manic symptoms noted.       Objective:     Vitals:  Vitals:    09/09/24 1352   Weight: 73.5 kg (162 lb 0.6 oz)   Height: 4' 9.99" (1.473 m)       Wt Readings from Last 3 Encounters:   09/09/24 1352 73.5 kg (162 lb 0.6 oz)   09/06/24 1303 73.5 kg (162 lb)   07/01/24 1511 74.4 kg (164 lb)         Medication:    Current Outpatient Medications:     ibuprofen (ADVIL,MOTRIN) 800 MG tablet, Take 1 tablet (800 mg total) by mouth every 6 (six) hours as needed for Pain., Disp: 30 tablet, Rfl: 0    levonorgestreL (KYLEENA) 17.5 mcg/24 hrs (5 yrs) 19.5 mg IUD, 19.5 mg by Intrauterine route As instructed. Every 5 years, Disp: , Rfl:     valACYclovir (VALTREX) 1000 MG tablet, Take 1 tablet (1,000 mg total) by mouth 2 (two) times daily as needed (as needed)., Disp: 6 tablet, Rfl: 6    clonazePAM (KLONOPIN) 0.5 MG tablet, Take 1 tablet (0.5 mg total) by mouth daily as needed " "for Anxiety., Disp: 30 tablet, Rfl: 2    hydrOXYzine (ATARAX) 50 MG tablet, Take 1.5 tablets (75 mg total) by mouth daily as needed for Anxiety., Disp: 45 tablet, Rfl: 2    sertraline (ZOLOFT) 100 MG tablet, Take 1.5 tablets (150 mg total) by mouth every evening., Disp: 45 tablet, Rfl: 2  No current facility-administered medications for this visit.    Facility-Administered Medications Ordered in Other Visits:     lactated ringers infusion, , Intravenous, Continuous, Baudoin, Fernando E, DO, Last Rate: 10 mL/hr at 06/30/22 1252, New Bag at 06/30/22 1252    sodium chloride 0.9% flush 10 mL, 10 mL, Intravenous, PRN, Baudoin, Fernando E, DO    sodium chloride 0.9% flush 10 mL, 10 mL, Intravenous, PRN, Baudoin, Fernando E, DO       Significant Labs: - none at this time    Significant Imaging: - none at this time    Physical Exam  Vitals and nursing note reviewed.   Constitutional:       General: She is awake.      Appearance: Normal appearance.   Musculoskeletal:      Comments: deferred   Neurological:      Mental Status: She is alert.   Psychiatric:         Attention and Perception: Attention and perception normal. She does not perceive auditory or visual hallucinations.         Mood and Affect: Affect normal.         Speech: Speech normal.         Behavior: Behavior is cooperative.         Thought Content: Thought content does not include homicidal or suicidal ideation.         Cognition and Memory: Cognition and memory normal.          Review of Systems     Mental Status Exam:  Presentation:  - Appearance: 28 y.o. year old Black or  female, appears stated age, appears Casually dressed and Well groomed  - Motility: No EPS or Tremors, No psychomotor agitation or retardation appreciated  - Behavior: calm, cooperative, good eye contact  Speech:  - Character/Organization: spontaneous, fluent, normal volume, normal rate, normal rhythm  Emotional State:  - Mood: "happy"   - Affect: congruent and " appropriate  Thought:  - Process: logical, linear, organized , goal-directed  - Preoccupations: no ruminations, rituals, or phobias appreciated  - Delusions: no persecutory, paranoid, or grandiose delusions appreciated  - Perception: denies AVH, not actively responding to internal stimuli  - SI/HI: denies/denies  Sensorium & Intellect:  - Sensorium: AAOx4  - Memory: intact to recent and remote events  - Attention/Concentration: good/good  - Insight/Judgement: good/good    Gait: deferred   MSK:deferred     All other systems without acute issues unless noted in HPI      Assessment/Plan      ICD-10-CM ICD-9-CM    1. PTSD (post-traumatic stress disorder)  F43.10 309.81 sertraline (ZOLOFT) 100 MG tablet      2. Recurrent major depressive disorder, remission status unspecified  F33.9 296.30 sertraline (ZOLOFT) 100 MG tablet      3. Generalized anxiety disorder  F41.1 300.02 hydrOXYzine (ATARAX) 50 MG tablet      clonazePAM (KLONOPIN) 0.5 MG tablet      4. Insomnia, unspecified type  G47.00 780.52 hydrOXYzine (ATARAX) 50 MG tablet         Continue current medications without change    Potential side effects and risks vs benefits of current treatment plan reviewed with patient. Applicable black box warnings reviewed. Encouraged patient not to alter dosages or abruptly discontinue medications without contacting prescriber first, due to risk of worsening symptoms and decompensation of mental status. Warned of risks associated with herbal remedies and supplements while taking psychotropic medications and of the need to consult prescriber prior to adding any of these to current regimen. Patient should abstain from abuse of alcohol, prescription medications, and illicit drugs. Reviewed when to contact clinic and/or seek emergent care, such as but not limited to, onset/worsening SI/HI, hallucinations, delusions, manic symptoms. Pt verbalized understanding and agreement of these warnings/recommendations and verbally consented to  treatment plan.    The patient was informed of the restrictions of face-to-face healthcare visits. The patient verbally consented to proceed with a telemedicine visit, following discussion of the options of face-to-face or telemedicine visits. The telemedicine visit was completed using EPIC Video call without complication. The visit was conducted with limited physical exam and was medically appropriate to meet the patient's needs.      Follow up in about 3 months (around 12/9/2024) for Medication Management.    RON CharlesP

## 2024-09-13 ENCOUNTER — HOSPITAL ENCOUNTER (OUTPATIENT)
Dept: GASTROENTEROLOGY | Facility: HOSPITAL | Age: 29
Discharge: HOME | End: 2024-09-13
Attending: SURGERY
Payer: COMMERCIAL

## 2024-09-13 ENCOUNTER — ANESTHESIA EVENT (OUTPATIENT)
Dept: GASTROENTEROLOGY | Facility: HOSPITAL | Age: 29
End: 2024-09-13
Payer: COMMERCIAL

## 2024-09-13 ENCOUNTER — HOSPITAL ENCOUNTER (EMERGENCY)
Facility: HOSPITAL | Age: 29
Discharge: HOME | End: 2024-09-13
Payer: COMMERCIAL

## 2024-09-13 ENCOUNTER — ANESTHESIA (OUTPATIENT)
Dept: GASTROENTEROLOGY | Facility: HOSPITAL | Age: 29
End: 2024-09-13
Payer: COMMERCIAL

## 2024-09-13 VITALS
RESPIRATION RATE: 20 BRPM | WEIGHT: 162.06 LBS | SYSTOLIC BLOOD PRESSURE: 97 MMHG | OXYGEN SATURATION: 96 % | DIASTOLIC BLOOD PRESSURE: 67 MMHG | BODY MASS INDEX: 33.88 KG/M2 | HEART RATE: 101 BPM | TEMPERATURE: 98 F

## 2024-09-13 VITALS
SYSTOLIC BLOOD PRESSURE: 102 MMHG | BODY MASS INDEX: 34 KG/M2 | HEART RATE: 108 BPM | RESPIRATION RATE: 16 BRPM | TEMPERATURE: 99 F | WEIGHT: 162 LBS | DIASTOLIC BLOOD PRESSURE: 70 MMHG | OXYGEN SATURATION: 95 % | HEIGHT: 58 IN

## 2024-09-13 DIAGNOSIS — R50.9 FEVER: ICD-10-CM

## 2024-09-13 DIAGNOSIS — K29.00 ACUTE GASTRITIS: Primary | ICD-10-CM

## 2024-09-13 DIAGNOSIS — R65.20 SEVERE SEPSIS: ICD-10-CM

## 2024-09-13 DIAGNOSIS — K21.9 GASTROESOPHAGEAL REFLUX DISEASE, UNSPECIFIED WHETHER ESOPHAGITIS PRESENT: ICD-10-CM

## 2024-09-13 DIAGNOSIS — N30.00 ACUTE CYSTITIS WITHOUT HEMATURIA: ICD-10-CM

## 2024-09-13 DIAGNOSIS — J69.0 ASPIRATION PNEUMONIA OF RIGHT LOWER LOBE DUE TO GASTRIC SECRETIONS: Primary | ICD-10-CM

## 2024-09-13 DIAGNOSIS — A41.9 SEVERE SEPSIS: ICD-10-CM

## 2024-09-13 LAB
ALBUMIN SERPL-MCNC: 4.4 G/DL (ref 3.4–5)
ALBUMIN/GLOB SERPL: 1.5 RATIO
ALP SERPL-CCNC: 63 UNIT/L (ref 50–144)
ALT SERPL-CCNC: 31 UNIT/L (ref 1–45)
ANION GAP SERPL CALC-SCNC: 9 MEQ/L (ref 2–13)
AST SERPL-CCNC: 37 UNIT/L (ref 14–36)
B-HCG SERPL QL: NEGATIVE
BACTERIA #/AREA URNS AUTO: ABNORMAL /HPF
BASOPHILS # BLD AUTO: 0.02 X10(3)/MCL (ref 0.01–0.08)
BASOPHILS NFR BLD AUTO: 0.1 % (ref 0.1–1.2)
BILIRUB SERPL-MCNC: 0.7 MG/DL (ref 0–1)
BILIRUB UR QL STRIP.AUTO: NEGATIVE
BUN SERPL-MCNC: 11 MG/DL (ref 7–20)
CALCIUM SERPL-MCNC: 10.1 MG/DL (ref 8.4–10.2)
CHLORIDE SERPL-SCNC: 105 MMOL/L (ref 98–110)
CLARITY UR: CLEAR
CO2 SERPL-SCNC: 23 MMOL/L (ref 21–32)
COLOR UR AUTO: YELLOW
CREAT SERPL-MCNC: 0.94 MG/DL (ref 0.66–1.25)
CREAT/UREA NIT SERPL: 12 (ref 12–20)
EOSINOPHIL # BLD AUTO: 0.02 X10(3)/MCL (ref 0.04–0.36)
EOSINOPHIL NFR BLD AUTO: 0.1 % (ref 0.7–7)
ERYTHROCYTE [DISTWIDTH] IN BLOOD BY AUTOMATED COUNT: 13.3 % (ref 11–14.5)
GFR SERPLBLD CREATININE-BSD FMLA CKD-EPI: 85 ML/MIN/1.73/M2
GLOBULIN SER-MCNC: 3 GM/DL (ref 2–3.9)
GLUCOSE SERPL-MCNC: 177 MG/DL (ref 70–115)
GLUCOSE UR QL STRIP: NEGATIVE
HCT VFR BLD AUTO: 38.2 % (ref 36–48)
HGB BLD-MCNC: 13.2 G/DL (ref 11.8–16)
HGB UR QL STRIP: ABNORMAL
IMM GRANULOCYTES # BLD AUTO: 0.07 X10(3)/MCL (ref 0–0.03)
IMM GRANULOCYTES NFR BLD AUTO: 0.5 % (ref 0–0.5)
INFLUENZA A (OHS): NEGATIVE
INFLUENZA B (OHS): NEGATIVE
KETONES UR QL STRIP: NEGATIVE
LACTATE SERPL-SCNC: 2.7 MMOL/L (ref 0.4–2)
LEUKOCYTE ESTERASE UR QL STRIP: ABNORMAL
LYMPHOCYTES # BLD AUTO: 0.79 X10(3)/MCL (ref 1.16–3.74)
LYMPHOCYTES NFR BLD AUTO: 5.6 % (ref 20–55)
MCH RBC QN AUTO: 28.3 PG (ref 27–34)
MCHC RBC AUTO-ENTMCNC: 34.6 G/DL (ref 31–37)
MCV RBC AUTO: 81.8 FL (ref 79–99)
MONOCYTES # BLD AUTO: 0.59 X10(3)/MCL (ref 0.24–0.36)
MONOCYTES NFR BLD AUTO: 4.2 % (ref 4.7–12.5)
NEUTROPHILS # BLD AUTO: 12.7 X10(3)/MCL (ref 1.56–6.13)
NEUTROPHILS NFR BLD AUTO: 89.5 % (ref 37–73)
NITRITE UR QL STRIP: NEGATIVE
NRBC BLD AUTO-RTO: 0 %
PH UR STRIP: 6.5 [PH]
PLATELET # BLD AUTO: 278 X10(3)/MCL (ref 140–371)
PMV BLD AUTO: 9.8 FL (ref 9.4–12.4)
POTASSIUM SERPL-SCNC: 3.8 MMOL/L (ref 3.5–5.1)
PROT SERPL-MCNC: 7.4 GM/DL (ref 6.3–8.2)
PROT UR QL STRIP: NEGATIVE
RAPID GROUP A STREP (OHS): NEGATIVE
RBC # BLD AUTO: 4.67 X10(6)/MCL (ref 4–5.1)
RBC #/AREA URNS AUTO: ABNORMAL /HPF
SARS-COV-2 RDRP RESP QL NAA+PROBE: NEGATIVE
SODIUM SERPL-SCNC: 137 MMOL/L (ref 136–145)
SP GR UR STRIP.AUTO: 1.01 (ref 1–1.03)
SQUAMOUS #/AREA URNS AUTO: ABNORMAL /HPF
UROBILINOGEN UR STRIP-ACNC: 0.2
WBC # BLD AUTO: 14.19 X10(3)/MCL (ref 4–11.5)
WBC #/AREA URNS AUTO: ABNORMAL /HPF

## 2024-09-13 PROCEDURE — 83605 ASSAY OF LACTIC ACID: CPT

## 2024-09-13 PROCEDURE — 63600175 PHARM REV CODE 636 W HCPCS: Performed by: SURGERY

## 2024-09-13 PROCEDURE — 96375 TX/PRO/DX INJ NEW DRUG ADDON: CPT

## 2024-09-13 PROCEDURE — 36415 COLL VENOUS BLD VENIPUNCTURE: CPT | Performed by: SURGERY

## 2024-09-13 PROCEDURE — 84703 CHORIONIC GONADOTROPIN ASSAY: CPT | Performed by: SURGERY

## 2024-09-13 PROCEDURE — 37000008 HC ANESTHESIA 1ST 15 MINUTES

## 2024-09-13 PROCEDURE — 87651 STREP A DNA AMP PROBE: CPT

## 2024-09-13 PROCEDURE — 25000003 PHARM REV CODE 250: Performed by: SURGERY

## 2024-09-13 PROCEDURE — S5010 5% DEXTROSE AND 0.45% SALINE: HCPCS | Performed by: NURSE ANESTHETIST, CERTIFIED REGISTERED

## 2024-09-13 PROCEDURE — 99284 EMERGENCY DEPT VISIT MOD MDM: CPT | Mod: 25

## 2024-09-13 PROCEDURE — 87400 INFLUENZA A/B EACH AG IA: CPT

## 2024-09-13 PROCEDURE — 63600175 PHARM REV CODE 636 W HCPCS

## 2024-09-13 PROCEDURE — 27201423 OPTIME MED/SURG SUP & DEVICES STERILE SUPPLY

## 2024-09-13 PROCEDURE — 43239 EGD BIOPSY SINGLE/MULTIPLE: CPT | Performed by: SURGERY

## 2024-09-13 PROCEDURE — 37000009 HC ANESTHESIA EA ADD 15 MINS

## 2024-09-13 PROCEDURE — 25000242 PHARM REV CODE 250 ALT 637 W/ HCPCS

## 2024-09-13 PROCEDURE — 87086 URINE CULTURE/COLONY COUNT: CPT

## 2024-09-13 PROCEDURE — 25000003 PHARM REV CODE 250: Performed by: NURSE ANESTHETIST, CERTIFIED REGISTERED

## 2024-09-13 PROCEDURE — 94761 N-INVAS EAR/PLS OXIMETRY MLT: CPT

## 2024-09-13 PROCEDURE — 25000003 PHARM REV CODE 250

## 2024-09-13 PROCEDURE — 96365 THER/PROPH/DIAG IV INF INIT: CPT

## 2024-09-13 PROCEDURE — 80053 COMPREHEN METABOLIC PANEL: CPT

## 2024-09-13 PROCEDURE — 85025 COMPLETE CBC W/AUTO DIFF WBC: CPT

## 2024-09-13 PROCEDURE — 94640 AIRWAY INHALATION TREATMENT: CPT

## 2024-09-13 PROCEDURE — U0002 COVID-19 LAB TEST NON-CDC: HCPCS

## 2024-09-13 PROCEDURE — 87040 BLOOD CULTURE FOR BACTERIA: CPT

## 2024-09-13 PROCEDURE — 81003 URINALYSIS AUTO W/O SCOPE: CPT

## 2024-09-13 PROCEDURE — 81015 MICROSCOPIC EXAM OF URINE: CPT

## 2024-09-13 PROCEDURE — 63600175 PHARM REV CODE 636 W HCPCS: Performed by: NURSE ANESTHETIST, CERTIFIED REGISTERED

## 2024-09-13 RX ORDER — PROPOFOL 10 MG/ML
VIAL (ML) INTRAVENOUS
Status: DISCONTINUED | OUTPATIENT
Start: 2024-09-13 | End: 2024-09-13

## 2024-09-13 RX ORDER — SODIUM CHLORIDE, SODIUM LACTATE, POTASSIUM CHLORIDE, CALCIUM CHLORIDE 600; 310; 30; 20 MG/100ML; MG/100ML; MG/100ML; MG/100ML
INJECTION, SOLUTION INTRAVENOUS CONTINUOUS
Status: DISCONTINUED | OUTPATIENT
Start: 2024-09-13 | End: 2024-09-14 | Stop reason: HOSPADM

## 2024-09-13 RX ORDER — GLYCOPYRROLATE 0.2 MG/ML
0.2 INJECTION INTRAMUSCULAR; INTRAVENOUS ONCE
Status: COMPLETED | OUTPATIENT
Start: 2024-09-13 | End: 2024-09-13

## 2024-09-13 RX ORDER — ALBUTEROL SULFATE 90 UG/1
2 INHALANT RESPIRATORY (INHALATION) ONCE
Status: COMPLETED | OUTPATIENT
Start: 2024-09-13 | End: 2024-09-13

## 2024-09-13 RX ORDER — LIDOCAINE HYDROCHLORIDE 20 MG/ML
INJECTION INTRAVENOUS
Status: DISCONTINUED | OUTPATIENT
Start: 2024-09-13 | End: 2024-09-13

## 2024-09-13 RX ORDER — DEXAMETHASONE SODIUM PHOSPHATE 4 MG/ML
12 INJECTION, SOLUTION INTRA-ARTICULAR; INTRALESIONAL; INTRAMUSCULAR; INTRAVENOUS; SOFT TISSUE
Status: COMPLETED | OUTPATIENT
Start: 2024-09-13 | End: 2024-09-13

## 2024-09-13 RX ORDER — IBUPROFEN 600 MG/1
600 TABLET ORAL
Status: COMPLETED | OUTPATIENT
Start: 2024-09-13 | End: 2024-09-13

## 2024-09-13 RX ORDER — FLUCONAZOLE 150 MG/1
150 TABLET ORAL DAILY
Qty: 1 TABLET | Refills: 0 | Status: SHIPPED | OUTPATIENT
Start: 2024-09-13 | End: 2024-09-14

## 2024-09-13 RX ORDER — AMOXICILLIN AND CLAVULANATE POTASSIUM 875; 125 MG/1; MG/1
1 TABLET, FILM COATED ORAL 2 TIMES DAILY
Qty: 14 TABLET | Refills: 0 | Status: SHIPPED | OUTPATIENT
Start: 2024-09-13

## 2024-09-13 RX ORDER — SODIUM CHLORIDE 9 MG/ML
INJECTION, SOLUTION INTRAVENOUS CONTINUOUS
Status: CANCELLED | OUTPATIENT
Start: 2024-09-13

## 2024-09-13 RX ORDER — DEXTROSE MONOHYDRATE AND SODIUM CHLORIDE 5; .45 G/100ML; G/100ML
INJECTION, SOLUTION INTRAVENOUS CONTINUOUS PRN
Status: DISCONTINUED | OUTPATIENT
Start: 2024-09-13 | End: 2024-09-13

## 2024-09-13 RX ORDER — ACETAMINOPHEN 500 MG
1000 TABLET ORAL
Status: COMPLETED | OUTPATIENT
Start: 2024-09-13 | End: 2024-09-13

## 2024-09-13 RX ORDER — IBUPROFEN 400 MG/1
400 TABLET ORAL ONCE
Status: COMPLETED | OUTPATIENT
Start: 2024-09-13 | End: 2024-09-13

## 2024-09-13 RX ADMIN — ALBUTEROL SULFATE 2 PUFF: 108 INHALANT RESPIRATORY (INHALATION) at 10:09

## 2024-09-13 RX ADMIN — IBUPROFEN 400 MG: 400 TABLET ORAL at 02:09

## 2024-09-13 RX ADMIN — PROPOFOL 150 MG: 10 INJECTION, EMULSION INTRAVENOUS at 01:09

## 2024-09-13 RX ADMIN — LIDOCAINE HYDROCHLORIDE 50 MG: 20 INJECTION, SOLUTION INTRAVENOUS at 01:09

## 2024-09-13 RX ADMIN — GLYCOPYRROLATE 0.2 MG: 0.2 INJECTION INTRAMUSCULAR; INTRAVENOUS at 12:09

## 2024-09-13 RX ADMIN — ACETAMINOPHEN 1000 MG: 500 TABLET ORAL at 08:09

## 2024-09-13 RX ADMIN — DEXTROSE AND SODIUM CHLORIDE: 5; 450 INJECTION, SOLUTION INTRAVENOUS at 12:09

## 2024-09-13 RX ADMIN — SODIUM CHLORIDE, POTASSIUM CHLORIDE, SODIUM LACTATE AND CALCIUM CHLORIDE: 600; 310; 30; 20 INJECTION, SOLUTION INTRAVENOUS at 12:09

## 2024-09-13 RX ADMIN — DEXAMETHASONE SODIUM PHOSPHATE 12 MG: 4 INJECTION, SOLUTION INTRA-ARTICULAR; INTRALESIONAL; INTRAMUSCULAR; INTRAVENOUS; SOFT TISSUE at 09:09

## 2024-09-13 RX ADMIN — IBUPROFEN 600 MG: 600 TABLET, FILM COATED ORAL at 08:09

## 2024-09-13 RX ADMIN — PROPOFOL 100 MG: 10 INJECTION, EMULSION INTRAVENOUS at 01:09

## 2024-09-13 RX ADMIN — SODIUM CHLORIDE 1000 ML: 9 INJECTION, SOLUTION INTRAVENOUS at 09:09

## 2024-09-13 RX ADMIN — CEFTRIAXONE SODIUM 2 G: 2 INJECTION, POWDER, FOR SOLUTION INTRAMUSCULAR; INTRAVENOUS at 09:09

## 2024-09-13 RX ADMIN — DEXTROSE AND SODIUM CHLORIDE: 5; 450 INJECTION, SOLUTION INTRAVENOUS at 01:09

## 2024-09-13 NOTE — PLAN OF CARE
C/o headache, would like some ibuprofen for it. Also, would like a sandwich and a Sprite - served.

## 2024-09-13 NOTE — DISCHARGE INSTRUCTIONS
Follow-up with Dr. Duran    Diet:  as tolerated    Activity:  decrease activity today, no driving today, resume all activity tomorrow    Notify MD:  increased swelling of abdomen, difficulty breathing/swallowing, excessive nausea/vomiting, excessive bright red bleeding from mouth/vomit,    Medications:  continue your home medications, keep a list of your home medications at all times for emergencies.

## 2024-09-13 NOTE — PLAN OF CARE
Returned from EGD, awake and alert, IV patent and site clear - infusing well. O2 @ 2L/nc. No complaints at present. Biopsy taken for h pylori and a hiatal hernia.   Otolaryngologist Procedure Text (C): After obtaining clear surgical margins the patient was sent to otolaryngology for surgical repair.  The patient understands they will receive post-surgical care and follow-up from the referring physician's office.

## 2024-09-13 NOTE — ANESTHESIA PREPROCEDURE EVALUATION
09/13/2024  Anahi Reza is a 28 y.o., female.  Surgical History    Procedure Laterality Date Comment Source   ROBOT-ASSISTED LAPAROSCOPIC REPAIR OF UMBILICAL HERNIA N/A 06/30/2022 Procedure: ROBOTIC REPAIR, HERNIA, UMBILICAL;  Surgeon: Isiah Duran MD;  Location: [facility];  Service: General;  Laterality: N/A;    WISDOM TOOTH EXTRACTION         Medical History    Diagnosis Date Comment Source   Anxiety      Depression      Herpes simplex virus (HSV) infection      Prediabetes      [place] teeth removed          Pre-op Assessment    I have reviewed the Patient Summary Reports.     I have reviewed the Nursing Notes. I have reviewed the NPO Status.   I have reviewed the Medications.     Review of Systems  Anesthesia Hx:  No problems with previous Anesthesia             Denies Family Hx of Anesthesia complications.    Denies Personal Hx of Anesthesia complications.                    Social:  Alcohol Use       Hematology/Oncology:  Hematology Normal   Oncology Normal                                   EENT/Dental:  EENT/Dental Normal           Cardiovascular:  Cardiovascular Normal Exercise tolerance: good                                           Pulmonary:  Pulmonary Normal                       Renal/:  Renal/ Normal                 Hepatic/GI:  Hepatic/GI Normal                 Musculoskeletal:  Musculoskeletal Normal                OB/GYN/PEDS:  HSV           Neurological:    Neuromuscular Disease,                                   Endocrine:  Endocrine Normal          Obesity / BMI > 30  Dermatological:  Skin Normal    Psych:  Psychiatric History anxiety  PTSD  Sucide attempt               Physical Exam  General: Well nourished, Cooperative, Alert and Oriented    Airway:  Mallampati: II / II  Mouth Opening: Normal  TM Distance: Normal  Tongue: Normal  Neck ROM: Normal  ROM    Dental:  Intact        Anesthesia Plan  Type of Anesthesia, risks & benefits discussed:    Anesthesia Type: MAC  Intra-op Monitoring Plan: Standard ASA Monitors  Post Op Pain Control Plan: multimodal analgesia  Induction:  IV  Informed Consent: Informed consent signed with the Patient and all parties understand the risks and agree with anesthesia plan.  All questions answered. Patient consented to blood products? Yes  ASA Score: 3  Day of Surgery Review of History & Physical: H&P Update referred to the surgeon/provider.I have interviewed and examined the patient. I have reviewed the patient's H&P dated: There are no significant changes.     Ready For Surgery From Anesthesia Perspective.     .

## 2024-09-13 NOTE — Clinical Note
"Anahi"Margaret Reza was seen and treated in our emergency department on 9/13/2024.  She may return to work on 09/17/2024.       If you have any questions or concerns, please don't hesitate to call.      Sergio Gutierrez MD"

## 2024-09-13 NOTE — ANESTHESIA POSTPROCEDURE EVALUATION
Anesthesia Post Evaluation    Patient: Anahi Reza    Procedure(s) Performed: * No procedures listed *    Final Anesthesia Type: MAC      Patient location during evaluation: PACU  Patient participation: Yes- Able to Participate  Level of consciousness: awake and alert, oriented and awake  Post-procedure vital signs: reviewed and stable  Pain management: adequate  Airway patency: patent  GLENNA mitigation strategies: Intraoperative administration of CPAP, nasopharyngeal airway, or oral appliance during sedation  PONV status at discharge: No PONV  Anesthetic complications: no      Cardiovascular status: hemodynamically stable  Respiratory status: room air  Hydration status: euvolemic  Follow-up not needed.              Vitals Value Taken Time   /67 09/13/24 1145   Temp 36.6 °C (97.9 °F) 09/13/24 1145   Pulse 73 09/13/24 1145   Resp 20 09/13/24 1145   SpO2 99 % 09/13/24 1145         No case tracking events are documented in the log.      Pain/Aparna Score: No data recorded

## 2024-09-14 NOTE — ED PROVIDER NOTES
Encounter Date: 9/13/2024       History     Chief Complaint   Patient presents with    Fever    Shortness of Breath    Headache     HEADACHE SINCE HAVING GASTROSCOPY THIS AFTERNOON. PT STATES SHE WAS GIVEN MOTRIN PRIOR TO DISCHARGE. PT STATES HEADACHE IS WORSE. NOW HAVING FEVER, CHILLS AND SOB     28-year-old female presents complaining of fever and chills.  These symptoms began this afternoon after getting an EGD.  She was not ill prior to the EGD.  He says she feels a little short of breath.  She was coughing after the procedure.    The history is provided by the patient.     Review of patient's allergies indicates:  No Known Allergies  Past Medical History:   Diagnosis Date    Anxiety     Depression     Herpes simplex virus (HSV) infection     Prediabetes     [place] teeth removed      Past Surgical History:   Procedure Laterality Date    GASTROSCOPY  09/13/2024    ROBOT-ASSISTED LAPAROSCOPIC REPAIR OF UMBILICAL HERNIA N/A 06/30/2022    Procedure: ROBOTIC REPAIR, HERNIA, UMBILICAL;  Surgeon: Isiah Duran MD;  Location: [facility];  Service: General;  Laterality: N/A;    WISDOM TOOTH EXTRACTION       Family History   Problem Relation Name Age of Onset    Depression Mother Tristan     Anxiety disorder Mother Tristan     Pulmonary embolism Father Christopher [place]    Early death Father Christopher [place]    Breast cancer Maternal Grandmother John Dean     Colon cancer Neg Hx      Ovarian cancer Neg Hx      Uterine cancer Neg Hx       Social History     Tobacco Use    Smoking status: Never     Passive exposure: Never    Smokeless tobacco: Never   Substance Use Topics    Alcohol use: Yes     Alcohol/week: 2.0 standard drinks of alcohol     Types: 2 Drinks containing 0.5 oz of alcohol per week     Comment: Occasionally    Drug use: Never     Review of Systems   Constitutional:  Positive for chills and fever.   HENT:  Negative for sore throat.    Respiratory:  Positive for cough and shortness of breath.     Cardiovascular:  Negative for chest pain.   Gastrointestinal:  Negative for nausea.   Genitourinary:  Negative for dysuria.   Musculoskeletal:  Negative for back pain.   Skin:  Negative for rash.   Neurological:  Negative for weakness.   Hematological:  Does not bruise/bleed easily.   All other systems reviewed and are negative.      Physical Exam     Initial Vitals [09/13/24 2000]   BP Pulse Resp Temp SpO2   116/78 (!) 133 20 (!) 102.8 °F (39.3 °C) 96 %      MAP       --         Physical Exam    Nursing note and vitals reviewed.  Constitutional: Vital signs are normal. She appears well-developed and well-nourished. She is cooperative.   She appears well.  She is freely ambulatory and conversant.   HENT:   Head: Normocephalic and atraumatic.   Mouth/Throat: Oropharynx is clear and moist.   Eyes: Conjunctivae, EOM and lids are normal. Pupils are equal, round, and reactive to light.   Neck: Trachea normal. Neck supple.   Normal range of motion.  Cardiovascular:  Regular rhythm, normal heart sounds and intact distal pulses.           Tachycardia   Pulmonary/Chest: Breath sounds normal. No respiratory distress. She has no wheezes. She has no rhonchi. She has no rales.   Abdominal: Abdomen is soft. Bowel sounds are normal.   Musculoskeletal:         General: Normal range of motion.      Cervical back: Normal, normal range of motion and neck supple.      Lumbar back: Normal.     Neurological: She is alert and oriented to person, place, and time. She has normal strength. Coordination normal. GCS score is 15. GCS eye subscore is 4. GCS verbal subscore is 5. GCS motor subscore is 6.   Skin: Skin is warm, dry and intact. Capillary refill takes less than 2 seconds.   Psychiatric: She has a normal mood and affect. Her speech is normal and behavior is normal. Judgment and thought content normal. Cognition and memory are normal.         ED Course   Procedures  Labs Reviewed   URINALYSIS, REFLEX TO URINE CULTURE - Abnormal        Result Value    Color, UA Yellow      Appearance, UA Clear      Specific Gravity, UA 1.015      pH, UA 6.5      Protein, UA Negative      Glucose, UA Negative      Ketones, UA Negative      Blood, UA Trace-Intact (*)     Bilirubin, UA Negative      Urobilinogen, UA 0.2      Nitrites, UA Negative      Leukocyte Esterase, UA Large (*)     Narrative:      URINE STABILITY IS 2 HOURS AT ROOM TEMP OR    SIX HOURS REFRIGERATED. PERFORMING TESTING ON    SPECIMENS GREATER THAN THIS AGE MAY AFFECT THE    FOLLOWING TESTS:    PH          SPECIFIC GRAVITY           BLOOD    CLARITY     BILIRUBIN               UROBILINOGEN   COMPREHENSIVE METABOLIC PANEL - Abnormal    Sodium 137      Potassium 3.8      Chloride 105      CO2 23      Glucose 177 (*)     Blood Urea Nitrogen 11      Creatinine 0.94      Calcium 10.1      Protein Total 7.4      Albumin 4.4      Globulin 3.0      Albumin/Globulin Ratio 1.5      Bilirubin Total 0.7      ALP 63      ALT 31      AST 37 (*)     eGFR 85      Anion Gap 9.0      BUN/Creatinine Ratio 12     LACTIC ACID, PLASMA - Abnormal    Lactic Acid Level 2.7 (*)    CBC WITH DIFFERENTIAL - Abnormal    WBC 14.19 (*)     RBC 4.67      Hgb 13.2      Hct 38.2      MCV 81.8      MCH 28.3      MCHC 34.6      RDW 13.3      Platelet 278      MPV 9.8      Neut % 89.5 (*)     Lymph % 5.6 (*)     Mono % 4.2 (*)     Eos % 0.1 (*)     Basophil % 0.1      Lymph # 0.79 (*)     Neut # 12.70 (*)     Mono # 0.59 (*)     Eos # 0.02 (*)     Baso # 0.02      IG# 0.07 (*)     IG% 0.5      NRBC% 0.0     URINALYSIS, MICROSCOPIC - Abnormal    Bacteria, UA Few (*)     RBC, UA 0-5      WBC, UA 11-20 (*)     Squamous Epithelial Cells, UA Moderate (*)    THROAT SCREEN, RAPID STREP - Normal    Rapid Group A Strep Negative     SARS-COV-2 RNA AMPLIFICATION, QUAL - Normal    SARS COV-2 Molecular Negative      Narrative:     The IDNOW COVID-19 assay is a rapid molecular in vitro diagnostic test utilizing an isothermal nucleic acid  amplification technology intended for the qualitative detection of nucleic acid from the SARS-CoV-2 viral RNA in direct nasal, nasopharyngeal or throat swabs from individuals who are suspected of COVID-19 by their healthcare provider.   RAPID INFLUENZA A/B   BLOOD CULTURE [facility]   BLOOD CULTURE [facility]   CULTURE, URINE   CBC W/ AUTO DIFFERENTIAL    Narrative:     The following orders were created for panel order CBC auto differential.  Procedure                               Abnormality         Status                     ---------                               -----------         ------                     CBC with Differential[[id number]]       Abnormal            Final result                 Please view results for these tests on the individual orders.          Imaging Results              X-Ray Chest PA And Lateral (Final result)  Result time 09/13/24 21:09:46      Final result by Alec Kitchen MD (09/13/24 21:09:46)                   Impression:      1. Subtle reticulonodular opacities in right lung base are concerning for early pulmonary infiltrate.      Electronically signed by: Alec Kitchen MD  Date:    09/13/2024  Time:    21:09               Narrative:    EXAMINATION:  XR CHEST PA AND LATERAL    CLINICAL HISTORY:  Fever, unspecified.    COMPARISON:  Chest x-ray 06/27/2022    FINDINGS:  Frontal and lateral views of the chest was obtained.  The cardiac silhouette is within normal limits for size.   There are subtle reticulonodular opacities in the right lung base which may reflect early pulmonary infiltrate.  Mild left basilar atelectasis is suspected.  No visible pneumothorax or pleural effusion is appreciated.  No acute displaced fracture is visualized.                        Wet Read by Sergio Gutierrez MD (09/13/24 20:45:10, [facility], Emergency Medicine)    There appears to be a developing right lower lobe infiltrate                                     Medications   cefTRIAXone  (ROCEPHIN) 2 g in D5W 100 mL IVPB (MB+) (2 g Intravenous New Bag 9/13/24 2121)   sodium chloride 0.9% bolus 1,000 mL 1,000 mL (1,000 mLs Intravenous New Bag 9/13/24 2120)   albuterol inhaler 2 puff (has no administration in time range)   acetaminophen tablet 1,000 mg (1,000 mg Oral Given 9/13/24 2014)   ibuprofen tablet 600 mg (600 mg Oral Given 9/13/24 2014)   dexAMETHasone injection 12 mg (12 mg Intravenous Given 9/13/24 2121)     Medical Decision Making  Fever, chills, mild SOB, EGD earlier today  Differential diagnosis:  Viral syndrome, sepsis, aspiration, COVID, strep throat  Tylenol, Motrin  Septic workup, swabs    Amount and/or Complexity of Data Reviewed  Labs: ordered.  Radiology: ordered and independent interpretation performed.  Discussion of management or test interpretation with external provider(s): I discussed all findings with the patient.  It appears her symptoms are due to aspiration after the EGD.  Technically, she meets severe sepsis criteria.  I discussed this with her also.  I gave her the option of staying in the hospital.  She prefers to go home.  I think she is safe to go home, and can always return if she worsens.    Risk  OTC drugs.  Prescription drug management.                                      Clinical Impression:  Final diagnoses:  [R50.9] Fever  [N30.00] Acute cystitis without hematuria  [J69.0] Aspiration pneumonia of right lower lobe due to gastric secretions (Primary)  [A41.9, R65.20] Severe sepsis          ED Disposition Condition    Discharge Good          ED Prescriptions       Medication Sig Dispense Start Date End Date Auth. Provider    amoxicillin-clavulanate 875-125mg (AUGMENTIN) 875-125 mg per tablet Take 1 tablet by mouth 2 (two) times daily. 14 tablet 9/13/2024 -- Sergio Gutierrez MD    fluconazole (DIFLUCAN) 150 MG Tab Take 1 tablet (150 mg total) by mouth once daily. for 1 day 1 tablet 9/13/2024 9/14/2024 Sergio Gutierrez MD          Follow-up Information        Follow up With Specialties Details Why Contact Info    Dinorah Sanchez, FNP-C Family Medicine Call in 3 days  [address]  [address]  [facility]  [facility] [address]  [phone]      [facility] Emergency Medicine Go to  If symptoms worsen [address]  [facility] [id number] [phone]             Sergio Gutierrez MD  09/13/24 2142       Sergio Gutierrez MD  09/13/24 2140

## 2024-09-14 NOTE — DISCHARGE INSTRUCTIONS
Start the antibiotics tomorrow.  Use the inhaler as needed for any shortness of breath.  Tylenol and/or ibuprofen for fever.  Return for any problems.

## 2024-09-16 ENCOUNTER — HOSPITAL ENCOUNTER (OUTPATIENT)
Dept: RADIOLOGY | Facility: HOSPITAL | Age: 29
Discharge: HOME | End: 2024-09-16
Attending: SURGERY
Payer: COMMERCIAL

## 2024-09-16 VITALS — WEIGHT: 162 LBS | BODY MASS INDEX: 33.86 KG/M2

## 2024-09-16 DIAGNOSIS — K21.9 ESOPHAGEAL REFLUX: ICD-10-CM

## 2024-09-16 LAB — BACTERIA UR CULT: NO GROWTH

## 2024-09-16 PROCEDURE — 78227 HEPATOBIL SYST IMAGE W/DRUG: CPT | Mod: TC

## 2024-09-16 PROCEDURE — 25000003 PHARM REV CODE 250: Performed by: SURGERY

## 2024-09-16 PROCEDURE — 63600175 PHARM REV CODE 636 W HCPCS: Performed by: SURGERY

## 2024-09-16 PROCEDURE — A9537 TC99M MEBROFENIN: HCPCS | Performed by: SURGERY

## 2024-09-16 RX ORDER — KIT FOR THE PREPARATION OF TECHNETIUM TC 99M MEBROFENIN 45 MG/10ML
8.2 INJECTION, POWDER, LYOPHILIZED, FOR SOLUTION INTRAVENOUS
Status: COMPLETED | OUTPATIENT
Start: 2024-09-16 | End: 2024-09-16

## 2024-09-16 RX ORDER — SINCALIDE 5 UG/5ML
0.02 INJECTION, POWDER, LYOPHILIZED, FOR SOLUTION INTRAVENOUS ONCE
Status: DISCONTINUED | OUTPATIENT
Start: 2024-09-16 | End: 2024-09-16

## 2024-09-16 RX ADMIN — SODIUM CHLORIDE: 9 INJECTION, SOLUTION INTRAVENOUS at 12:09

## 2024-09-16 RX ADMIN — MEBROFENIN 8.2 MILLICURIE: 45 INJECTION, POWDER, LYOPHILIZED, FOR SOLUTION INTRAVENOUS at 11:09

## 2024-09-17 LAB — BEAKER SEE SCANNED REPORT: NORMAL

## 2024-09-17 NOTE — DISCHARGE SUMMARY
[facility]End[facility]  Discharge Note  Short Stay    EGD      OUTCOME: Patient tolerated treatment/procedure well without complication and is now ready for discharge.    DISPOSITION: Home or Self Care    FINAL DIAGNOSIS:  Acute gastritis    FOLLOWUP: In clinic    DISCHARGE INSTRUCTIONS:  No discharge procedures on file.      Clinical Reference Documents Added to Patient Instructions         Document    H. PYLORI TEST (ENGLISH)    HIATAL HERNIA (ENGLISH)    UPPER GI ENDOSCOPY (ENGLISH)            TIME SPENT ON DISCHARGE: 5 minutes

## 2024-09-18 LAB — BACTERIA BLD CULT: NORMAL

## 2024-09-19 LAB — BACTERIA BLD CULT: NORMAL

## 2024-10-04 ENCOUNTER — OFFICE VISIT (OUTPATIENT)
Dept: OBSTETRICS AND GYNECOLOGY | Facility: CLINIC | Age: 29
End: 2024-10-04
Payer: COMMERCIAL

## 2024-10-04 VITALS
HEIGHT: 58 IN | SYSTOLIC BLOOD PRESSURE: 110 MMHG | BODY MASS INDEX: 33.72 KG/M2 | DIASTOLIC BLOOD PRESSURE: 72 MMHG | TEMPERATURE: 97 F | WEIGHT: 160.63 LBS

## 2024-10-04 DIAGNOSIS — R14.3 EXCESSIVE FLATUS: ICD-10-CM

## 2024-10-04 DIAGNOSIS — Z30.431 INTRAUTERINE DEVICE SURVEILLANCE: Primary | ICD-10-CM

## 2024-10-04 DIAGNOSIS — R14.0 ABDOMINAL BLOATING: ICD-10-CM

## 2024-10-04 LAB
B-HCG UR QL: NEGATIVE
CTP QC/QA: YES

## 2024-10-04 NOTE — PROGRESS NOTES
Chief Complaint     Abdominal bloating/Swelling    HPI:     Patient is a 28 y.o.  presents today with complaints of upper abdominal bloating, swelling. Denies pelvic pain. Hx of abdominal hernia sx in . S/p EGD 24. Reports EGD WNL. Reports excessive gas. Kyleena inserted 3/16/21, irregular menses with IUD. LMP 24.    S/p hospital admission 24 for pneumonia, sepsis     Gyn History:    Menstrual History  Cycle: Yes  Menarche Age: 10 years  Flow Duration: 5  Flow: Normal  Interval:  (Irregular with IUD)  Intermenstrual Bleeding: No  Dysmenorrhea: No    Menopause  Menopause Age: 1 years    Pap History  Last pap date: 24 (NIL)  Result: Normal  History of Abnormal Pap: No  HPV Vaccine Completed: No (0/3)    [place]  Sexually Active: No  STI History: Yes  STI Type: Chlamydia, HSV - Genital  Contraception: Yes  Contraception Type: IUD (Kyleena inserted 3/16/21)    Breast History  Last Breast Imaging Date: No  History of Breast Biopsy: No      Past Medical History:   Diagnosis Date    Anxiety     Depression     Herpes simplex virus (HSV) infection     Prediabetes     [place] teeth removed        Past Surgical History:   Procedure Laterality Date    GASTROSCOPY  2024    ROBOT-ASSISTED LAPAROSCOPIC REPAIR OF UMBILICAL HERNIA N/A 2022    Procedure: ROBOTIC REPAIR, HERNIA, UMBILICAL;  Surgeon: Isiah Duran MD;  Location: [facility];  Service: General;  Laterality: N/A;    WISDOM TOOTH EXTRACTION         Family History   Problem Relation Name Age of Onset    Depression Mother Tristan     Anxiety disorder Mother Tristan     Pulmonary embolism Father [place] [id number]    Early death Father [place] [address]    Breast cancer Maternal Grandmother John Dean     Colon cancer Neg Hx      Ovarian cancer Neg Hx      Uterine cancer Neg Hx         OB History          2    Para   1    Term   1            AB   1    Living   1         SAB   1    IAB        Ectopic         Multiple        Live Births   1                 Current Outpatient Medications on File Prior to Visit   Medication Sig Dispense Refill    clonazePAM (KLONOPIN) 0.5 MG tablet Take 1 tablet (0.5 mg total) by mouth daily as needed for Anxiety. 30 tablet 2    hydrOXYzine (ATARAX) 50 MG tablet Take 1.5 tablets (75 mg total) by mouth daily as needed for Anxiety. 45 tablet 2    ibuprofen (ADVIL,MOTRIN) 800 MG tablet Take 1 tablet (800 mg total) by mouth every 6 (six) hours as needed for Pain. 30 tablet 0    levonorgestreL (KYLEENA) 17.5 mcg/24 hrs (5 yrs) 19.5 mg IUD 19.5 mg by Intrauterine route As instructed. Every 5 years      sertraline (ZOLOFT) 100 MG tablet Take 1.5 tablets (150 mg total) by mouth every evening. 45 tablet 2    valACYclovir (VALTREX) 1000 MG tablet Take 1 tablet (1,000 mg total) by mouth 2 (two) times daily as needed (as needed). 6 tablet 6    amoxicillin-clavulanate 875-125mg (AUGMENTIN) 875-125 mg per tablet Take 1 tablet by mouth 2 (two) times daily. 14 tablet 0     Current Facility-Administered Medications on File Prior to Visit   Medication Dose Route Frequency Provider Last Rate Last Admin    lactated ringers infusion   Intravenous Continuous Fernando Moncada DO 10 mL/hr at 06/30/22 1252 New Bag at 06/30/22 1252    sodium chloride 0.9% flush 10 mL  10 mL Intravenous PRN Fernando Moncada DO        sodium chloride 0.9% flush 10 mL  10 mL Intravenous PRN Fernando Moncada DO           Review of Systems:       Review of Systems   Constitutional:  Negative for chills and fever.   Gastrointestinal:  Positive for bloating. Negative for abdominal pain, constipation and diarrhea.   Genitourinary:  Negative for bladder incontinence, decreased libido, dysmenorrhea, dyspareunia, dysuria, flank pain, frequency, genital sores, hematuria, hot flashes, menorrhagia, menstrual problem, pelvic pain, urgency, vaginal bleeding, vaginal discharge, vaginal pain, urinary incontinence, postcoital bleeding,  "postmenopausal bleeding, vaginal dryness and vaginal odor.        Physical Exam:    /72   Temp 97.2 °F (36.2 °C)   Ht 4' 10" (1.473 m)   Wt 72.8 kg (160 lb 9.6 oz)   LMP 07/09/2024 (Exact Date) Comment: neg pregnancy test 9/13  BMI 33.57 kg/m²     Physical Exam   General Exam:    General Appearance: alert, in no acute distress, normal, well nourished.  Psych:  Orientation: time, place, person.  Mood/Affect: Normal   Abdomen:  - Soft. Non-tender. No rebound tenderness or guardingNo masses. Liver normal. Spleen normal. No hernia palpable.      Assessment:   1. Intrauterine device surveillance  -     POCT urine pregnancy    2. Abdominal bloating    3. Excessive flatus             Plan:   1. Intrauterine device surveillance  - POCT urine pregnancy    2. Abdominal bloating    3. Excessive flatus    Abdominal bloating does not appear to be GYN in origin at this time. Recommend continue GI workup, follow up with PCP. Recommend GasX, stool softener, avoidance of constipation, dietary changes. Precautions.    RTC prn/annual     This note was transcribed by Paty Swartz. There may be transcription errors as a result, however minimal. Effort has been made to ensure accuracy of transcription, but any obvious errors or omissions should be clarified with the author of the document.       I agree with the above documentation.             "

## 2024-11-02 DIAGNOSIS — F43.10 PTSD (POST-TRAUMATIC STRESS DISORDER): ICD-10-CM

## 2024-11-02 DIAGNOSIS — F33.9 RECURRENT MAJOR DEPRESSIVE DISORDER, REMISSION STATUS UNSPECIFIED: ICD-10-CM

## 2024-11-02 DIAGNOSIS — F41.1 GENERALIZED ANXIETY DISORDER: ICD-10-CM

## 2024-11-04 RX ORDER — CLONAZEPAM 0.5 MG/1
0.5 TABLET ORAL DAILY PRN
Qty: 30 TABLET | Refills: 2 | OUTPATIENT
Start: 2024-11-04

## 2024-11-04 RX ORDER — SERTRALINE HYDROCHLORIDE 100 MG/1
150 TABLET, FILM COATED ORAL NIGHTLY
Qty: 45 TABLET | Refills: 2 | OUTPATIENT
Start: 2024-11-04

## 2024-11-20 ENCOUNTER — PATIENT MESSAGE (OUTPATIENT)
Dept: ADMINISTRATIVE | Facility: HOSPITAL | Age: 29
End: 2024-11-20
Payer: COMMERCIAL

## 2024-12-12 DIAGNOSIS — B00.9 HSV (HERPES SIMPLEX VIRUS) INFECTION: ICD-10-CM

## 2024-12-12 RX ORDER — VALACYCLOVIR HYDROCHLORIDE 1 G/1
1000 TABLET, FILM COATED ORAL 2 TIMES DAILY PRN
Qty: 6 TABLET | Refills: 6 | Status: SHIPPED | OUTPATIENT
Start: 2024-12-12

## 2025-01-08 ENCOUNTER — OFFICE VISIT (OUTPATIENT)
Dept: BEHAVIORAL HEALTH | Facility: CLINIC | Age: 30
End: 2025-01-08
Payer: COMMERCIAL

## 2025-01-08 VITALS
HEART RATE: 80 BPM | TEMPERATURE: 97 F | HEIGHT: 58 IN | BODY MASS INDEX: 33.73 KG/M2 | WEIGHT: 160.69 LBS | OXYGEN SATURATION: 96 % | SYSTOLIC BLOOD PRESSURE: 110 MMHG | DIASTOLIC BLOOD PRESSURE: 70 MMHG

## 2025-01-08 DIAGNOSIS — F33.9 RECURRENT MAJOR DEPRESSIVE DISORDER, REMISSION STATUS UNSPECIFIED: ICD-10-CM

## 2025-01-08 DIAGNOSIS — F43.10 PTSD (POST-TRAUMATIC STRESS DISORDER): Primary | ICD-10-CM

## 2025-01-08 DIAGNOSIS — G47.00 INSOMNIA, UNSPECIFIED TYPE: ICD-10-CM

## 2025-01-08 DIAGNOSIS — F41.1 GENERALIZED ANXIETY DISORDER: ICD-10-CM

## 2025-01-08 PROCEDURE — 1159F MED LIST DOCD IN RCRD: CPT | Mod: CPTII,,, | Performed by: NURSE PRACTITIONER

## 2025-01-08 PROCEDURE — 3078F DIAST BP <80 MM HG: CPT | Mod: CPTII,,, | Performed by: NURSE PRACTITIONER

## 2025-01-08 PROCEDURE — 3008F BODY MASS INDEX DOCD: CPT | Mod: CPTII,,, | Performed by: NURSE PRACTITIONER

## 2025-01-08 PROCEDURE — 99214 OFFICE O/P EST MOD 30 MIN: CPT | Mod: ,,, | Performed by: NURSE PRACTITIONER

## 2025-01-08 PROCEDURE — 3074F SYST BP LT 130 MM HG: CPT | Mod: CPTII,,, | Performed by: NURSE PRACTITIONER

## 2025-01-08 PROCEDURE — 1160F RVW MEDS BY RX/DR IN RCRD: CPT | Mod: CPTII,,, | Performed by: NURSE PRACTITIONER

## 2025-01-08 RX ORDER — SERTRALINE HYDROCHLORIDE 100 MG/1
150 TABLET, FILM COATED ORAL NIGHTLY
Qty: 45 TABLET | Refills: 1 | Status: SHIPPED | OUTPATIENT
Start: 2025-01-08

## 2025-01-08 RX ORDER — CLONAZEPAM 0.5 MG/1
0.5 TABLET ORAL 2 TIMES DAILY PRN
Qty: 60 TABLET | Refills: 1 | Status: SHIPPED | OUTPATIENT
Start: 2025-01-08

## 2025-01-08 NOTE — PROGRESS NOTES
"PSYCHIATRIC FOLLOW-UP VISIT NOTE    Chief Complaint   Patient presents with    Medication Management     Medication management         History of Present Illness  29 y.o. year old Black or  female with hx of PTSD, MDD, ketan, and insomnia seen today for follow-up appointment and medication management.  Patient reports that she experienced some severe depression and anxiety in early December due to being bullied at work.  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  She has been the holidays at home with her family and reports having a good [place] and new year's.  She has had some persistent increased anxiety due to her sister moving from [place] to [place] few months ago.  She has required inpatient psychiatric treatment 3 times since she moved.  Patient is often very concerned about her in her well-being.  This is also having a negative effect on the mother and patient is having a difficult time managing her as well.  Feels her p.r.n. clonazepam is not as effective as it once was.  She does deny any recent moderate to severe depression.  Has been sleeping well at night and feels rested in the morning.  Denies any side effects from current medication regimen. Patient denies SI/HI. Denies hallucinations and does not appear to be responding to internal stimuli or be internally preoccupied. No manic symptoms noted.       Objective:     Vitals:  Vitals:    01/08/25 1534   BP: 110/70   BP Location: Right arm   Patient Position: Sitting   Pulse: 80   Temp: 97.3 °F (36.3 °C)   TempSrc: Temporal   SpO2: 96%   Weight: 72.9 kg (160 lb 11.5 oz)   Height: 4' 9.99" (1.473 m)       Wt Readings from Last 3 Encounters:   01/08/25 1534 72.9 kg (160 lb 11.5 oz)   10/04/24 1059 72.8 kg (160 lb 9.6 oz)   09/16/24 1222 73.5 kg (162 lb)         Medication:    Current Outpatient Medications:     hydrOXYzine " (ATARAX) 50 MG tablet, Take 1.5 tablets (75 mg total) by mouth daily as needed for Anxiety., Disp: 45 tablet, Rfl: 2    ibuprofen (ADVIL,MOTRIN) 800 MG tablet, Take 1 tablet (800 mg total) by mouth every 6 (six) hours as needed for Pain., Disp: 30 tablet, Rfl: 0    levonorgestreL (KYLEENA) 17.5 mcg/24 hrs (5 yrs) 19.5 mg IUD, 19.5 mg by Intrauterine route As instructed. Every 5 years, Disp: , Rfl:     valACYclovir (VALTREX) 1000 MG tablet, Take 1 tablet (1,000 mg total) by mouth 2 (two) times daily as needed (as needed)., Disp: 6 tablet, Rfl: 6    clonazePAM (KLONOPIN) 0.5 MG tablet, Take 1 tablet (0.5 mg total) by mouth 2 (two) times daily as needed for Anxiety., Disp: 60 tablet, Rfl: 1    sertraline (ZOLOFT) 100 MG tablet, Take 1.5 tablets (150 mg total) by mouth every evening., Disp: 45 tablet, Rfl: 1  No current facility-administered medications for this visit.    Facility-Administered Medications Ordered in Other Visits:     lactated ringers infusion, , Intravenous, Continuous, Fernando Moncada, DO, Last Rate: 10 mL/hr at 06/30/22 1252, New Bag at 06/30/22 1252    sodium chloride 0.9% flush 10 mL, 10 mL, Intravenous, PRN, Baudoin, Fernando E, DO    sodium chloride 0.9% flush 10 mL, 10 mL, Intravenous, PRN, Baudoin Fernando E, DO       Significant Labs: - none at this time    Significant Imaging: - none at this time    Physical Exam  Vitals and nursing note reviewed.   Constitutional:       General: She is awake.      Appearance: Normal appearance.   Musculoskeletal:      Comments: Full ROM   Neurological:      Mental Status: She is alert.   Psychiatric:         Attention and Perception: Attention and perception normal. She does not perceive auditory or visual hallucinations.         Mood and Affect: Affect normal. Mood is anxious.         Speech: Speech normal.         Behavior: Behavior is cooperative.         Thought Content: Thought content does not include homicidal or suicidal ideation.         Cognition and  "Memory: Cognition and memory normal.         Judgment: Judgment normal.      Comments: Anxious affect          Review of Systems     Mental Status Exam:  Presentation:  - Appearance: 29 y.o. year old Black or  female, appears stated age, appears Casually dressed and Well groomed  - Motility: Erect when standing, Steady gait, No EPS or Tremors, No psychomotor agitation or retardation appreciated  - Behavior: anxious, cooperative, maintains eye contact  Speech:  - Character/Organization: spontaneous, fluent, normal volume, normal rate, normal rhythm  Emotional State:  - Mood: "anxious"   - Affect: congruent and appropriate  Thought:  - Process: logical, linear, organized , goal-directed  - Preoccupations: family  - Delusions: no persecutory, paranoid, or grandiose delusions appreciated  - Perception: denies AVH, not actively responding to internal stimuli  - SI/HI: denies/denies  Sensorium & Intellect:  - Sensorium: AAOx4  - Memory: intact to recent and remote events  - Attention/Concentration: good/good  - Insight/Judgement: good/good    Gait: normal swing and stance  MSK:no rigidity appreciated    All other systems without acute issues unless noted in HPI      Assessment/Plan      ICD-10-CM ICD-9-CM    1. PTSD (post-traumatic stress disorder)  F43.10 309.81 sertraline (ZOLOFT) 100 MG tablet      2. Recurrent major depressive disorder, remission status unspecified  F33.9 296.30 sertraline (ZOLOFT) 100 MG tablet      3. Generalized anxiety disorder  F41.1 300.02 clonazePAM (KLONOPIN) 0.5 MG tablet      4. Insomnia, unspecified type  G47.00 780.52          Increase clonazepam 0.5 mg 2 b.i.d. p.r.n. anxiety    Continue other medications without change     checked. Results as expected. No suspected diversion or abuse of controlled substances.    Potential side effects and risks vs benefits of current treatment plan reviewed with patient. Applicable black box warnings reviewed. Encouraged patient not to " alter dosages or abruptly discontinue medications without contacting prescriber first, due to risk of worsening symptoms and decompensation of mental status. Warned of risks associated with herbal remedies and supplements while taking psychotropic medications and of the need to consult prescriber prior to adding any of these to current regimen. Patient should abstain from abuse of alcohol, prescription medications, and illicit drugs. Reviewed when to contact clinic and/or seek emergent care, such as but not limited to, onset/worsening SI/HI, hallucinations, delusions, manic symptoms. Pt verbalized understanding and agreement of these warnings/recommendations and verbally consented to treatment plan.      Follow up in about 6 weeks (around 2/19/2025) for Medication Management.        Bahman Zabala, RONP

## 2025-02-19 ENCOUNTER — PATIENT MESSAGE (OUTPATIENT)
Dept: ADMINISTRATIVE | Facility: HOSPITAL | Age: 30
End: 2025-02-19
Payer: COMMERCIAL

## 2025-02-26 ENCOUNTER — PATIENT OUTREACH (OUTPATIENT)
Facility: CLINIC | Age: 30
End: 2025-02-26
Payer: COMMERCIAL

## 2025-02-26 NOTE — PROGRESS NOTES
Health Maintenance Topic(s) Outreach Outcomes & Actions Taken:    Eye Exam - Outreach Outcomes & Actions Taken  : note on upcoming appointment    Lab(s) - Outreach Outcomes & Actions Taken  : Scheduled 5/2/25    Primary Care Appt - Outreach Outcomes & Actions Taken  : Scheduled 5/9/25         Additional Notes:  Scheduled appointments from [employer] Follow-Up, messaged in portal.

## 2025-04-13 DIAGNOSIS — B00.9 HSV (HERPES SIMPLEX VIRUS) INFECTION: ICD-10-CM

## 2025-04-14 RX ORDER — VALACYCLOVIR HYDROCHLORIDE 1 G/1
1000 TABLET, FILM COATED ORAL 2 TIMES DAILY PRN
Qty: 6 TABLET | Refills: 6 | Status: SHIPPED | OUTPATIENT
Start: 2025-04-14

## 2025-04-17 DIAGNOSIS — Z00.00 ENCOUNTER FOR ADULT WELLNESS VISIT: Primary | ICD-10-CM

## 2025-04-18 DIAGNOSIS — F33.9 RECURRENT MAJOR DEPRESSIVE DISORDER, REMISSION STATUS UNSPECIFIED: ICD-10-CM

## 2025-04-18 DIAGNOSIS — F43.10 PTSD (POST-TRAUMATIC STRESS DISORDER): ICD-10-CM

## 2025-04-21 ENCOUNTER — PATIENT MESSAGE (OUTPATIENT)
Dept: BEHAVIORAL HEALTH | Facility: CLINIC | Age: 30
End: 2025-04-21
Payer: COMMERCIAL

## 2025-04-21 DIAGNOSIS — F33.9 RECURRENT MAJOR DEPRESSIVE DISORDER, REMISSION STATUS UNSPECIFIED: ICD-10-CM

## 2025-04-21 DIAGNOSIS — F43.10 PTSD (POST-TRAUMATIC STRESS DISORDER): ICD-10-CM

## 2025-04-21 RX ORDER — SERTRALINE HYDROCHLORIDE 100 MG/1
150 TABLET, FILM COATED ORAL NIGHTLY
Qty: 45 TABLET | Refills: 1 | OUTPATIENT
Start: 2025-04-21

## 2025-04-21 RX ORDER — SERTRALINE HYDROCHLORIDE 100 MG/1
150 TABLET, FILM COATED ORAL NIGHTLY
Qty: 45 TABLET | Refills: 1 | Status: SHIPPED | OUTPATIENT
Start: 2025-04-21

## 2025-04-22 RX ORDER — SERTRALINE HYDROCHLORIDE 100 MG/1
150 TABLET, FILM COATED ORAL NIGHTLY
Qty: 45 TABLET | Refills: 1 | OUTPATIENT
Start: 2025-04-22

## 2025-04-24 ENCOUNTER — OFFICE VISIT (OUTPATIENT)
Dept: FAMILY MEDICINE | Facility: CLINIC | Age: 30
End: 2025-04-24
Payer: COMMERCIAL

## 2025-04-24 VITALS
WEIGHT: 162 LBS | HEIGHT: 58 IN | TEMPERATURE: 98 F | HEART RATE: 78 BPM | BODY MASS INDEX: 34 KG/M2 | DIASTOLIC BLOOD PRESSURE: 68 MMHG | OXYGEN SATURATION: 98 % | SYSTOLIC BLOOD PRESSURE: 110 MMHG

## 2025-04-24 DIAGNOSIS — L30.9 ECZEMA, UNSPECIFIED TYPE: Primary | ICD-10-CM

## 2025-04-24 PROCEDURE — 3008F BODY MASS INDEX DOCD: CPT | Mod: CPTII,,, | Performed by: NURSE PRACTITIONER

## 2025-04-24 PROCEDURE — G2211 COMPLEX E/M VISIT ADD ON: HCPCS | Mod: ,,, | Performed by: NURSE PRACTITIONER

## 2025-04-24 PROCEDURE — 99213 OFFICE O/P EST LOW 20 MIN: CPT | Mod: ,,, | Performed by: NURSE PRACTITIONER

## 2025-04-24 PROCEDURE — 1159F MED LIST DOCD IN RCRD: CPT | Mod: CPTII,,, | Performed by: NURSE PRACTITIONER

## 2025-04-24 PROCEDURE — 3074F SYST BP LT 130 MM HG: CPT | Mod: CPTII,,, | Performed by: NURSE PRACTITIONER

## 2025-04-24 PROCEDURE — 3078F DIAST BP <80 MM HG: CPT | Mod: CPTII,,, | Performed by: NURSE PRACTITIONER

## 2025-04-24 PROCEDURE — 1160F RVW MEDS BY RX/DR IN RCRD: CPT | Mod: CPTII,,, | Performed by: NURSE PRACTITIONER

## 2025-04-28 PROBLEM — L30.9 ECZEMA: Status: ACTIVE | Noted: 2025-04-28

## 2025-04-28 NOTE — PROGRESS NOTES
Patient ID: [id number]     Chief Complaint: dark spot      HPI:     Anahi Reza is a 29 y.o. female in the office for dark spot  First noticed on her face years ago but recently spread over eye.  No history of eczema or asthma. Rash burns.    Past Medical History:  has a past medical history of Anxiety, Depression, Herpes simplex virus (HSV) infection, Prediabetes, and [place] teeth removed.    Social History:  reports that she has never smoked. She has never been exposed to tobacco smoke. She has never used smokeless tobacco. She reports current alcohol use of about 2.0 standard drinks of alcohol per week. She reports that she does not use drugs.    Current Outpatient Medications   Medication Instructions    clonazePAM (KLONOPIN) 0.5 mg, Oral, 2 times daily PRN    hydrOXYzine (ATARAX) 75 mg, Oral, Daily PRN    ibuprofen (ADVIL,MOTRIN) 800 mg, Oral, Every 6 hours PRN    KYLEENA 19.5 mg, See admin instructions    sertraline (ZOLOFT) 150 mg, Oral, Nightly    valACYclovir (VALTREX) 1,000 mg, Oral, 2 times daily PRN       Patient has no known allergies.     Patient Care Team:  Dinorah Sanchez FNP-C as PCP - General (Family Medicine)  Bahman Zabala PMHNP as Nurse Practitioner (Psychiatry)  Tisha Ramirez NP as Nurse Practitioner (Obstetrics and Gynecology)  Roger Valderrama     Subjective     Review of Systems   Constitutional:  Negative for activity change and unexpected weight change.   HENT:  Negative for hearing loss, rhinorrhea and trouble swallowing.    Eyes:  Negative for discharge and visual disturbance.   Respiratory:  Negative for chest tightness and wheezing.    Cardiovascular:  Negative for chest pain and palpitations.   Gastrointestinal:  Negative for blood in stool, constipation, diarrhea and vomiting.   Endocrine: Negative for polydipsia and polyuria.   Genitourinary:  Negative for difficulty urinating, dysuria, hematuria and menstrual problem.   Musculoskeletal:  Negative for  "arthralgias, joint swelling and neck pain.   Neurological:  Negative for weakness and headaches.   Psychiatric/Behavioral:  Negative for confusion and dysphoric mood.        See HPI     Objective     Visit Vitals  /68 (BP Location: Right arm)   Pulse 78   Temp 97.5 °F (36.4 °C) (Temporal)   Ht 4' 9.99" (1.473 m)   Wt 73.5 kg (162 lb)   LMP 04/16/2025   SpO2 98%   BMI 33.87 kg/m²       Physical Exam  Vitals reviewed.   Constitutional:       General: She is not in acute distress.  Cardiovascular:      Rate and Rhythm: Normal rate and regular rhythm.      Heart sounds: Normal heart sounds.   Pulmonary:      Effort: Pulmonary effort is normal.      Breath sounds: Normal breath sounds.   Skin:     General: Skin is warm and dry.      Findings: Rash (see photo) present.   Neurological:      Mental Status: She is alert and oriented to person, place, and time.   Psychiatric:         Mood and Affect: Mood normal.         Assessment & Plan:     1. Eczema, unspecified type  Assessment & Plan:  Use OTC hydrocortisone cream and Aquaphor.  Follow up in 1-2 weeks.            Follow up if symptoms worsen or fail to improve. In addition to their next scheduled appointment, the patient has also been instructed to follow up on as needed basis.     Future Appointments   Date Time Provider Department Center   5/2/2025  9:40 AM LAB, [facility] LABORATORY DRAW STATION [facility] FARHEEN [facility]   5/9/2025  2:00 PM Dinorah Sanchez FNP-C [facility] JESS [facility]   6/30/2025  3:30 PM Tisha Ramirez, NP [facility]        Lab Frequency Next Occurrence   Ambulatory referral/consult to Orthopedics Once 07/08/2024   FL Upper GI Once 08/26/2024   FL Small Bowel Follow Through Once 08/26/2024   NM Gastric Emptying Once 08/27/2024   Ambulatory referral/consult to Optometry Once 11/28/2024   CBC Auto Differential Once 04/17/2025   Comprehensive Metabolic Panel Once 04/17/2025   Lipid Panel Once 04/17/2025   Hemoglobin A1C Once " 04/17/2025   TSH Once 04/17/2025

## 2025-05-02 ENCOUNTER — LAB VISIT (OUTPATIENT)
Dept: LAB | Facility: HOSPITAL | Age: 30
End: 2025-05-02
Attending: NURSE PRACTITIONER
Payer: COMMERCIAL

## 2025-05-02 ENCOUNTER — RESULTS FOLLOW-UP (OUTPATIENT)
Dept: FAMILY MEDICINE | Facility: CLINIC | Age: 30
End: 2025-05-02

## 2025-05-02 DIAGNOSIS — Z00.00 ENCOUNTER FOR ADULT WELLNESS VISIT: ICD-10-CM

## 2025-05-02 LAB
ALBUMIN SERPL-MCNC: 4.4 G/DL (ref 3.4–5)
ALBUMIN/GLOB SERPL: 1.6 RATIO
ALP SERPL-CCNC: 60 UNIT/L (ref 50–144)
ALT SERPL-CCNC: 15 UNIT/L (ref 1–45)
ANION GAP SERPL CALC-SCNC: 8 MEQ/L (ref 2–13)
AST SERPL-CCNC: 22 UNIT/L (ref 14–36)
BASOPHILS # BLD AUTO: 0.02 X10(3)/MCL (ref 0.01–0.08)
BASOPHILS NFR BLD AUTO: 0.3 % (ref 0.1–1.2)
BILIRUB SERPL-MCNC: 0.5 MG/DL (ref 0–1)
BUN SERPL-MCNC: 13 MG/DL (ref 7–20)
CALCIUM SERPL-MCNC: 9.3 MG/DL (ref 8.4–10.2)
CHLORIDE SERPL-SCNC: 108 MMOL/L (ref 98–110)
CHOLEST SERPL-MCNC: 198 MG/DL (ref 0–200)
CO2 SERPL-SCNC: 22 MMOL/L (ref 21–32)
CREAT SERPL-MCNC: 0.94 MG/DL (ref 0.66–1.25)
CREAT/UREA NIT SERPL: 14 (ref 12–20)
EOSINOPHIL # BLD AUTO: 0.12 X10(3)/MCL (ref 0.04–0.36)
EOSINOPHIL NFR BLD AUTO: 1.6 % (ref 0.7–7)
ERYTHROCYTE [DISTWIDTH] IN BLOOD BY AUTOMATED COUNT: 13.7 % (ref 11–14.5)
EST. AVERAGE GLUCOSE BLD GHB EST-MCNC: 145.6 MG/DL (ref 70–115)
GFR SERPLBLD CREATININE-BSD FMLA CKD-EPI: 84 ML/MIN/1.73/M2
GLOBULIN SER-MCNC: 2.7 GM/DL (ref 2–3.9)
GLUCOSE SERPL-MCNC: 165 MG/DL (ref 70–115)
HBA1C MFR BLD: 6.7 % (ref 4–6)
HCT VFR BLD AUTO: 38 % (ref 36–48)
HDLC SERPL-MCNC: 52 MG/DL (ref 40–60)
HGB BLD-MCNC: 12.9 G/DL (ref 11.8–16)
IMM GRANULOCYTES # BLD AUTO: 0.03 X10(3)/MCL (ref 0–0.03)
IMM GRANULOCYTES NFR BLD AUTO: 0.4 % (ref 0–0.5)
LDLC SERPL DIRECT ASSAY-SCNC: 98.1 MG/DL (ref 30–100)
LYMPHOCYTES # BLD AUTO: 2.21 X10(3)/MCL (ref 1.16–3.74)
LYMPHOCYTES NFR BLD AUTO: 29.2 % (ref 20–55)
MCH RBC QN AUTO: 28.2 PG (ref 27–34)
MCHC RBC AUTO-ENTMCNC: 33.9 G/DL (ref 31–37)
MCV RBC AUTO: 83 FL (ref 79–99)
MONOCYTES # BLD AUTO: 0.48 X10(3)/MCL (ref 0.24–0.36)
MONOCYTES NFR BLD AUTO: 6.3 % (ref 4.7–12.5)
NEUTROPHILS # BLD AUTO: 4.71 X10(3)/MCL (ref 1.56–6.13)
NEUTROPHILS NFR BLD AUTO: 62.2 % (ref 37–73)
NRBC BLD AUTO-RTO: 0 %
PLATELET # BLD AUTO: 290 X10(3)/MCL (ref 140–371)
PMV BLD AUTO: 9.6 FL (ref 9.4–12.4)
POTASSIUM SERPL-SCNC: 4.5 MMOL/L (ref 3.5–5.1)
PROT SERPL-MCNC: 7.1 GM/DL (ref 6.3–8.2)
RBC # BLD AUTO: 4.58 X10(6)/MCL (ref 4–5.1)
SODIUM SERPL-SCNC: 138 MMOL/L (ref 136–145)
TRIGL SERPL-MCNC: 192 MG/DL (ref 30–200)
TSH SERPL-ACNC: 2.02 UIU/ML (ref 0.36–3.74)
WBC # BLD AUTO: 7.57 X10(3)/MCL (ref 4–11.5)

## 2025-05-02 PROCEDURE — 83036 HEMOGLOBIN GLYCOSYLATED A1C: CPT

## 2025-05-02 PROCEDURE — 80053 COMPREHEN METABOLIC PANEL: CPT

## 2025-05-02 PROCEDURE — 85025 COMPLETE CBC W/AUTO DIFF WBC: CPT

## 2025-05-02 PROCEDURE — 84443 ASSAY THYROID STIM HORMONE: CPT

## 2025-05-02 PROCEDURE — 80061 LIPID PANEL: CPT

## 2025-05-02 PROCEDURE — 36415 COLL VENOUS BLD VENIPUNCTURE: CPT

## 2025-05-19 ENCOUNTER — PATIENT MESSAGE (OUTPATIENT)
Dept: ADMINISTRATIVE | Facility: HOSPITAL | Age: 30
End: 2025-05-19
Payer: COMMERCIAL

## 2025-05-20 ENCOUNTER — PATIENT MESSAGE (OUTPATIENT)
Dept: FAMILY MEDICINE | Facility: CLINIC | Age: 30
End: 2025-05-20
Payer: COMMERCIAL

## 2025-05-20 ENCOUNTER — PATIENT OUTREACH (OUTPATIENT)
Facility: CLINIC | Age: 30
End: 2025-05-20
Payer: COMMERCIAL

## 2025-05-20 DIAGNOSIS — R73.03 PREDIABETES: Primary | ICD-10-CM

## 2025-05-20 NOTE — PROGRESS NOTES
Referral sent for eye exam to Dinorah Mei OD in [place]. Campaign message response sent with phone number.

## 2025-06-02 ENCOUNTER — HOSPITAL ENCOUNTER (EMERGENCY)
Facility: HOSPITAL | Age: 30
Discharge: HOME | End: 2025-06-02
Payer: COMMERCIAL

## 2025-06-02 VITALS
WEIGHT: 158 LBS | BODY MASS INDEX: 33.17 KG/M2 | HEIGHT: 58 IN | TEMPERATURE: 98 F | SYSTOLIC BLOOD PRESSURE: 106 MMHG | OXYGEN SATURATION: 98 % | RESPIRATION RATE: 17 BRPM | HEART RATE: 103 BPM | DIASTOLIC BLOOD PRESSURE: 79 MMHG

## 2025-06-02 DIAGNOSIS — R19.7 DIARRHEA, UNSPECIFIED TYPE: ICD-10-CM

## 2025-06-02 DIAGNOSIS — R11.2 NAUSEA AND VOMITING, UNSPECIFIED VOMITING TYPE: Primary | ICD-10-CM

## 2025-06-02 LAB
ALBUMIN SERPL-MCNC: 5.1 G/DL (ref 3.4–5)
ALBUMIN/GLOB SERPL: 1.5 RATIO
ALP SERPL-CCNC: 68 UNIT/L (ref 50–144)
ALT SERPL-CCNC: 18 UNIT/L (ref 1–45)
ANION GAP SERPL CALC-SCNC: 9 MEQ/L (ref 2–13)
AST SERPL-CCNC: 26 UNIT/L (ref 14–36)
BASOPHILS # BLD AUTO: 0.03 X10(3)/MCL (ref 0.01–0.08)
BASOPHILS NFR BLD AUTO: 0.3 % (ref 0.1–1.2)
BILIRUB SERPL-MCNC: 0.5 MG/DL (ref 0–1)
BUN SERPL-MCNC: 14 MG/DL (ref 7–20)
CALCIUM SERPL-MCNC: 9.9 MG/DL (ref 8.4–10.2)
CHLORIDE SERPL-SCNC: 108 MMOL/L (ref 98–110)
CO2 SERPL-SCNC: 21 MMOL/L (ref 21–32)
CREAT SERPL-MCNC: 1.03 MG/DL (ref 0.66–1.25)
CREAT/UREA NIT SERPL: 14 (ref 12–20)
EOSINOPHIL # BLD AUTO: 0.03 X10(3)/MCL (ref 0.04–0.36)
EOSINOPHIL NFR BLD AUTO: 0.3 % (ref 0.7–7)
ERYTHROCYTE [DISTWIDTH] IN BLOOD BY AUTOMATED COUNT: 13.5 % (ref 11–14.5)
FLUAV AG UPPER RESP QL IA.RAPID: NOT DETECTED
FLUBV AG UPPER RESP QL IA.RAPID: NOT DETECTED
GFR SERPLBLD CREATININE-BSD FMLA CKD-EPI: 76 ML/MIN/1.73/M2
GLOBULIN SER-MCNC: 3.5 GM/DL (ref 2–3.9)
GLUCOSE SERPL-MCNC: 129 MG/DL (ref 70–115)
HCT VFR BLD AUTO: 42.5 % (ref 36–48)
HGB BLD-MCNC: 14.3 G/DL (ref 11.8–16)
IMM GRANULOCYTES # BLD AUTO: 0.17 X10(3)/MCL (ref 0–0.03)
IMM GRANULOCYTES NFR BLD AUTO: 1.7 % (ref 0–0.5)
LYMPHOCYTES # BLD AUTO: 1.69 X10(3)/MCL (ref 1.16–3.74)
LYMPHOCYTES NFR BLD AUTO: 17 % (ref 20–55)
MCH RBC QN AUTO: 28 PG (ref 27–34)
MCHC RBC AUTO-ENTMCNC: 33.6 G/DL (ref 31–37)
MCV RBC AUTO: 83.3 FL (ref 79–99)
MONOCYTES # BLD AUTO: 0.46 X10(3)/MCL (ref 0.24–0.36)
MONOCYTES NFR BLD AUTO: 4.6 % (ref 4.7–12.5)
NEUTROPHILS # BLD AUTO: 7.59 X10(3)/MCL (ref 1.56–6.13)
NEUTROPHILS NFR BLD AUTO: 76.1 % (ref 37–73)
NRBC BLD AUTO-RTO: 0 %
PLATELET # BLD AUTO: 345 X10(3)/MCL (ref 140–371)
PMV BLD AUTO: 9.6 FL (ref 9.4–12.4)
POTASSIUM SERPL-SCNC: 4.3 MMOL/L (ref 3.5–5.1)
PROT SERPL-MCNC: 8.6 GM/DL (ref 6.3–8.2)
RBC # BLD AUTO: 5.1 X10(6)/MCL (ref 4–5.1)
SARS-COV-2 RNA RESP QL NAA+PROBE: NEGATIVE
SODIUM SERPL-SCNC: 138 MMOL/L (ref 136–145)
WBC # BLD AUTO: 9.97 X10(3)/MCL (ref 4–11.5)

## 2025-06-02 PROCEDURE — 25000003 PHARM REV CODE 250: Performed by: NURSE PRACTITIONER

## 2025-06-02 PROCEDURE — 80053 COMPREHEN METABOLIC PANEL: CPT | Performed by: NURSE PRACTITIONER

## 2025-06-02 PROCEDURE — 85025 COMPLETE CBC W/AUTO DIFF WBC: CPT | Performed by: NURSE PRACTITIONER

## 2025-06-02 PROCEDURE — 99283 EMERGENCY DEPT VISIT LOW MDM: CPT

## 2025-06-02 PROCEDURE — 0240U COVID/FLU A&B PCR: CPT | Performed by: NURSE PRACTITIONER

## 2025-06-02 RX ORDER — ONDANSETRON 4 MG/1
4 TABLET, ORALLY DISINTEGRATING ORAL
Status: COMPLETED | OUTPATIENT
Start: 2025-06-02 | End: 2025-06-02

## 2025-06-02 RX ORDER — ONDANSETRON 4 MG/1
4 TABLET, ORALLY DISINTEGRATING ORAL EVERY 6 HOURS PRN
Qty: 12 TABLET | Refills: 0 | Status: SHIPPED | OUTPATIENT
Start: 2025-06-02 | End: 2025-06-05

## 2025-06-02 RX ADMIN — ONDANSETRON 4 MG: 4 TABLET, ORALLY DISINTEGRATING ORAL at 11:06

## 2025-06-02 NOTE — Clinical Note
"Anahi Sanonkorina Reza was seen and treated in our emergency department on 6/2/2025.  She may return to work on 06/04/2025.       If you have any questions or concerns, please don't hesitate to call.      Robyn Vyas, BJ"

## 2025-06-02 NOTE — ED PROVIDER NOTES
Encounter Date: 6/2/2025       History     Chief Complaint   Patient presents with    Vomiting     Pt c/o N/V/D and weakness onset 2 hours ago. NO meds taken.      29 year old female presents with nausea, vomiting and diarrhea that started 2 hours ago.  Denies taking any medication.     The history is provided by the patient. No  was used.     Review of patient's allergies indicates:  No Known Allergies  Past Medical History:   Diagnosis Date    Anxiety     Depression     Herpes simplex virus (HSV) infection     Prediabetes     [place] teeth removed      Past Surgical History:   Procedure Laterality Date    ABDOMINAL SURGERY  June 2022    Umbilical hernia repair    GASTROSCOPY  09/13/2024    ROBOT-ASSISTED LAPAROSCOPIC REPAIR OF UMBILICAL HERNIA N/A 06/30/2022    Procedure: ROBOTIC REPAIR, HERNIA, UMBILICAL;  Surgeon: Isiah Duran MD;  Location: [facility];  Service: General;  Laterality: N/A;    WISDOM TOOTH EXTRACTION       Family History   Problem Relation Name Age of Onset    Depression Mother Tristan     Anxiety disorder Mother Tristan     Pulmonary embolism Father [place] [id number]    Early death Father [place] [address]    Breast cancer Maternal Grandmother John Dean     Colon cancer Neg Hx      Ovarian cancer Neg Hx      Uterine cancer Neg Hx       Social History[1]  Review of Systems    Physical Exam     Initial Vitals [06/02/25 1057]   BP Pulse Resp Temp SpO2   (!) 130/90 (!) 112 18 97.6 °F (36.4 °C) 99 %      MAP       --         Physical Exam    ED Course   Procedures  Labs Reviewed   COMPREHENSIVE METABOLIC PANEL - Abnormal       Result Value    Sodium 138      Potassium 4.3      Chloride 108      CO2 21      Glucose 129 (*)     Blood Urea Nitrogen 14      Creatinine 1.03      Calcium 9.9      Protein Total 8.6 (*)     Albumin 5.1 (*)     Globulin 3.5      Albumin/Globulin Ratio 1.5      Bilirubin Total 0.5      ALP 68      ALT 18      AST 26      eGFR 76      Anion Gap  9.0      BUN/Creatinine Ratio 14     CBC WITH DIFFERENTIAL - Abnormal    WBC 9.97      RBC 5.10      Hgb 14.3      Hct 42.5      MCV 83.3      MCH 28.0      MCHC 33.6      RDW 13.5      Platelet 345      MPV 9.6      Neut % 76.1 (*)     Lymph % 17.0 (*)     Mono % 4.6 (*)     Eos % 0.3 (*)     Basophil % 0.3      Lymph # 1.69      Neut # 7.59 (*)     Mono # 0.46 (*)     Eos # 0.03 (*)     Baso # 0.03      Imm Gran # 0.17 (*)     Imm Grans % 1.7 (*)     NRBC% 0.0     COVID/FLU A&B PCR - Normal    Influenza A PCR Not Detected      Influenza B PCR Not Detected      SARS-CoV-2 PCR Negative      Narrative:     The Xpert Xpress SARS-CoV-2/FLU/RSV plus is a rapid, multiplexed real-time PCR test intended for the simultaneous qualitative detection and differentiation of SARS-CoV-2, Influenza A, Influenza B, and respiratory syncytial virus (RSV) viral RNA in either nasopharyngeal swab or nasal swab specimens.         CBC W/ AUTO DIFFERENTIAL    Narrative:     The following orders were created for panel order CBC auto differential.  Procedure                               Abnormality         Status                     ---------                               -----------         ------                     CBC with Differential[[id number]]       Abnormal            Final result                 Please view results for these tests on the individual orders.          Imaging Results    None          Medications   ondansetron disintegrating tablet 4 mg (4 mg Oral Given 6/2/25 1114)     Medical Decision Making  Problems Addressed:  Diarrhea, unspecified type: acute illness or injury  Nausea and vomiting, unspecified vomiting type: acute illness or injury    Amount and/or Complexity of Data Reviewed  Labs: ordered. Decision-making details documented in ED Course.    Risk  Prescription drug management.                                      Clinical Impression:  Final diagnoses:  [R11.2] Nausea and vomiting, unspecified vomiting type  (Primary)  [R19.7] Diarrhea, unspecified type          ED Disposition Condition    Discharge Stable          ED Prescriptions       Medication Sig Dispense Start Date End Date Auth. Provider    ondansetron (ZOFRAN-ODT) 4 MG TbDL Take 1 tablet (4 mg total) by mouth every 6 (six) hours as needed (nausea vomiting). 12 tablet 6/2/2025 6/5/2025 Robyn Vyas FNP          Follow-up Information       Follow up With Specialties Details Why Contact Info    Dinorah Sanchez FNP-TORI [facility] In 3 days If symptoms worsen [address]  [address]  [facility]  [facility] [address]  [phone]                     [1]   Social History  Tobacco Use    Smoking status: Never     Passive exposure: Never    Smokeless tobacco: Never   Substance Use Topics    Alcohol use: Yes     Alcohol/week: 2.0 standard drinks of alcohol     Types: 2 Drinks containing 0.5 oz of alcohol per week     Comment: Occasionally    Drug use: Never        Robyn Vyas FNP  06/02/25 1222

## 2025-06-16 DIAGNOSIS — B00.9 HSV (HERPES SIMPLEX VIRUS) INFECTION: ICD-10-CM

## 2025-06-16 RX ORDER — VALACYCLOVIR HYDROCHLORIDE 1 G/1
1000 TABLET, FILM COATED ORAL 2 TIMES DAILY PRN
Qty: 6 TABLET | Refills: 6 | Status: SHIPPED | OUTPATIENT
Start: 2025-06-16

## 2025-06-24 DIAGNOSIS — F43.10 PTSD (POST-TRAUMATIC STRESS DISORDER): ICD-10-CM

## 2025-06-24 DIAGNOSIS — F33.9 RECURRENT MAJOR DEPRESSIVE DISORDER, REMISSION STATUS UNSPECIFIED: ICD-10-CM

## 2025-06-24 RX ORDER — SERTRALINE HYDROCHLORIDE 100 MG/1
150 TABLET, FILM COATED ORAL NIGHTLY
Qty: 45 TABLET | Refills: 5 | Status: SHIPPED | OUTPATIENT
Start: 2025-06-24

## 2025-07-27 DIAGNOSIS — F33.9 RECURRENT MAJOR DEPRESSIVE DISORDER, REMISSION STATUS UNSPECIFIED: ICD-10-CM

## 2025-07-27 DIAGNOSIS — F43.10 PTSD (POST-TRAUMATIC STRESS DISORDER): ICD-10-CM

## 2025-07-28 RX ORDER — SERTRALINE HYDROCHLORIDE 100 MG/1
150 TABLET, FILM COATED ORAL NIGHTLY
Qty: 45 TABLET | Refills: 5 | Status: SHIPPED | OUTPATIENT
Start: 2025-07-28

## 2025-08-04 ENCOUNTER — RESULTS FOLLOW-UP (OUTPATIENT)
Dept: OTHER | Facility: CLINIC | Age: 30
End: 2025-08-04
Payer: COMMERCIAL

## 2025-08-04 NOTE — PROGRESS NOTES
Chief Complaint:   Well Woman     History of Present Illness:  Anahi Reza is a 29 y.o. year old  presents for her well woman exam. Currently relying on Kyleena, 3/16/21, for birth control. Patient's last menstrual period was 2025 (within days). Desires to discuss IUD removal.does not desire contraception at this time.    Desires STI testing.    Last pap: 24 NIL    Cancer-related family history includes Breast cancer in her maternal grandmother. There is no history of Ovarian cancer or Uterine cancer.        Gyn History:  Menstrual History  Cycle: Yes  Menarche Age: 10 years  Flow Duration: 5  Flow: Normal  Interval:  (irregular with IUD)  Intermenstrual Bleeding: No  Dysmenorrhea: No    Menopause  Menopause Age: 1 years    Pap History  Last pap date: 24 (NIL)  Result: Normal  History of Abnormal Pap: No  HPV Vaccine Completed: No    [place]  Sexually Active: No  STI History: Yes  STI Type: HSV - Genital, Chlamydia  Contraception: Yes  Contraception Type: IUD (Kyleena inserted 3/16/21)    Breast History  Last Breast Imaging Date: No  History of Breast Biopsy: No        Review of Systems:  General/Constitutional: Chills denies. Fatigue/weakness denies. Fever denies. Night sweats denies. Hot flashes denies    Respiratory: Cough denies. Hemoptysis denies. SOB denies. Sputum production denies. Wheezing denies .   Cardiovascular: Chest pain denies. Dizziness denies. Palpitations denies. Swelling in hands/feet denies.                Breast: [place] denies. Breast mass denies. Breast pain/tenderness denies. Nipple discharge denies. Puckering denies.  Gastrointestinal: Abdominal pain denies. Blood in stool denies. Constipation denies. Diarrhea denies. Heartburn denies. Nausea denies. Vomiting denies    Genitourinary: Incontinence denies. Blood in urine denies. Frequent urination denies. Painful urination denies. Urinary urgency denies. Nocturia denies    Gynecologic: Irregular bleeding  "denies. Heavy bleeding denies. Painful menses denies. Vaginal discharge denies. Vaginal odor denies. Vaginal itching denies. Vaginal lesion denies. Pelvic pain denies. Decreased libido denies. Vulvar lesion denies. Prolapse of genital organs denies. Painful intercourse denies. Postcoital bleeding denies    Psychiatric: Depression denies. Anxiety denies.     OB History    Para Term  AB Living   2 1 1 0 1 1   SAB IAB Ectopic Multiple Live Births   1 0 0 0 1      # 1 - Date: , Sex: None, Weight: None, GA: None, Type: None, Apgar1: None, Apgar5: None, Living: Fetal Demise, Birth Comments: None    # 2 - Date: 19, Sex: Male, Weight: 2.466 kg (5 lb 7 oz), GA: None, Type: Vaginal, Spontaneous, Apgar1: None, Apgar5: None, Living: Living, Birth Comments: None      Past Medical History:   Diagnosis Date    Anxiety     Depression     Herpes simplex virus (HSV) infection     Prediabetes     [place] teeth removed      Current Medications[1]    Review of patient's allergies indicates:  No Known Allergies    Past Surgical History:   Procedure Laterality Date    ABDOMINAL SURGERY  2022    Umbilical hernia repair    GASTROSCOPY  2024    ROBOT-ASSISTED LAPAROSCOPIC REPAIR OF UMBILICAL HERNIA N/A 2022    Procedure: ROBOTIC REPAIR, HERNIA, UMBILICAL;  Surgeon: Isiah Duran MD;  Location: [facility];  Service: General;  Laterality: N/A;    WISDOM TOOTH EXTRACTION       Family History   Problem Relation Name Age of Onset    Depression Mother Tristan     Anxiety disorder Mother Tristan     Pulmonary embolism Father [place] [id number]    Early death Father [place] [id number]    Breast cancer Maternal Grandmother John Dean     Colon cancer Neg Hx      Ovarian cancer Neg Hx      Uterine cancer Neg Hx       Social History[2]    Physical Exam:  /78 (BP Location: Right arm, Patient Position: Sitting)   Ht 4' 10" (1.473 m)   Wt 75.3 kg (166 lb)   LMP 2025 (Within Days)   BMI " 34.69 kg/m²     Chaperone: present.       General appearance: healthy, well-nourished and well-developed     Psychiatric: Orientation to time, place and person. Normal mood and affect and active, alert     Skin: Appearance: no rashes or lesions.     Neck:   Neck: supple, FROM, trachea midline. and no masses   Thyroid: no enlargement or nodules and non-tender.       Cardiovascular:   Auscultation: RRR and no murmur.   Peripheral Vascular: no varicosities, LLE edema, RLE edema, calf tenderness, and palpable cord and pedal pulses intact.     Lungs:   Respiratory effort: no intercostal retractions or accessory muscle usage.   Auscultation: no wheezing, rales/crackles, or rhonchi and clear to auscultation.     Breast:   Inspection/Palpation: no tenderness, discrete/distinct masses, skin changes, or abnormal secretions. Nipple appearance normal.     Abdomen:   Auscultation/Inspection/Palpation: no hepatomegaly, splenomegaly, masses, tenderness or CVA tenderness and soft, non-distended bowel sounds preset.    Hernia: no palpable hernias.     Female Genitalia:    Vulva: no masses, tenderness or lesions    Bladder/Urethra: no urethral discharge or mass, normal meatus, bladder non-distended.    Vagina: no tenderness, erythema, cystocele, rectocele, abnormal vaginal discharge or vesicle(s) or ulcers    Cervix: no discharge, no cervical lacerations noted or motion tenderness and grossly normal +IUD strings seen   Uterus: normal size and shape and midline, non-tender, and no uterine prolapse.    Adnexa/Parametria: no parametrial tenderness or mass, no adnexal tenderness or ovarian mass.     Lymph Nodes:   Palpation: non tender submandibular nodes, axillary nodes, or inguinal nodes.     Rectal Exam:   Rectum: normal perianal skin.         PROCEDURE  IUD Removal:  Pelvic examination was normal. Pap smear is current. Urine pregnancy test negative.  With the patient in the dorsal lithotomy position, a speculum was placed into  vagina.  The strings were easily grasped with: Ring Forceps.  The Kyleena IUD was removed and shown to the patient.  Patient tolerated procedure well.       Assessment/Plan:  1. Routine gynecological examination  PAP CT/NG/TV  Counseled regarding safe sex practices and prevention of STD's .  Discussed contraception options.  Advised avoidance of tobacco, alcohol, and drugs.  Discussed breast self-awareness  Seat belt  Multivitamin, Ca/Vit D  Healthy diet and exercise  RTC 1 yr    2. Encounter for screening for infections with predominantly sexual mode of transmission  Discussed the various types of STDs, related symptoms and the potential consequences (including effects on fertility) of STD infections.       Reviewed ways to limit exposure and prevention techniques.       Cultures: gc/cz/tv    Labs: HIV/RPR/Hep Panel     3. HSV (herpes simplex virus) infection  Educated patient genital herpes simplex is a common sexually transmitted virus infection that is found worldwide. Most of these genital infections are caused by herpes simplex virus-2 (HSV-2), but herpes simplex virus-1 (HSV-1) also produces a clinically similar disease, and the incidence of HSV-1 genital disease is increasing.        Educated on disease process     Educated on STIs, offered testing     Discussed with patient if she has more than 5-6 outbreaks/year may need suppression     Refills on Valtrex Rx     4. BMI 34.0-34.9,adult  Weight loss with healthy diet and exercise modifications    5. Need for HPV vaccine  Human papilloma virus HPV is associated with anogenital cancer (including cervical, vaginal, vulvar, penile, and anal), oropharyngeal cancer, and genital warts.       The HPV vaccine significantly reduces the incidence of anogenital cancer and genital warts       Safe sex STI precautions     3 injection series: initiation, 2 months, and 6 months     6. IUD removal  Desires removal  Consents signed  Patient tolerated well.     We discussed  possible cramping and bleeding over the next few days.  Patient informed that return to fertility is immediate.  Patient encouraged to take PNV daily.   Declines contraception at this time         This note was transcribed by Viktoria Skinner MA. There may be transcription errors as a result, however minimal. I agree with transcription and every effort has been made to ensure accuracy of transcription, but any obvious errors or omissions should be clarified with the author of the document.    I agree with the above documentation              [1]   Current Outpatient Medications:     clonazePAM (KLONOPIN) 0.5 MG tablet, Take 1 tablet (0.5 mg total) by mouth 2 (two) times daily as needed for Anxiety., Disp: 60 tablet, Rfl: 1    hydrOXYzine (ATARAX) 50 MG tablet, Take 1.5 tablets (75 mg total) by mouth daily as needed for Anxiety., Disp: 45 tablet, Rfl: 2    ibuprofen (ADVIL,MOTRIN) 800 MG tablet, Take 1 tablet (800 mg total) by mouth every 6 (six) hours as needed for Pain., Disp: 30 tablet, Rfl: 0    levonorgestreL (KYLEENA) 17.5 mcg/24 hrs (5 yrs) 19.5 mg IUD, 19.5 mg by Intrauterine route As instructed. Every 5 years, Disp: , Rfl:     sertraline (ZOLOFT) 100 MG tablet, Take 1.5 tablets (150 mg total) by mouth every evening., Disp: 45 tablet, Rfl: 5    valACYclovir (VALTREX) 1000 MG tablet, Take 1 tablet (1,000 mg total) by mouth 2 (two) times daily as needed (as needed)., Disp: 6 tablet, Rfl: 6  No current facility-administered medications for this visit.    Facility-Administered Medications Ordered in Other Visits:     lactated ringers infusion, , Intravenous, Continuous, Baudoin, Fernando E, DO, Last Rate: 10 mL/hr at 06/30/22 1252, New Bag at 06/30/22 1252    sodium chloride 0.9% flush 10 mL, 10 mL, Intravenous, PRN, Baudoin, Fernando E, DO    sodium chloride 0.9% flush 10 mL, 10 mL, Intravenous, PRN, Baudoin, Fernando E, DO  [2]   Social History  Socioeconomic History    Marital status: Single    Number of children: 1    Tobacco Use    Smoking status: Never     Passive exposure: Never    Smokeless tobacco: Never   Substance and Sexual Activity    Alcohol use: Yes     Alcohol/week: 2.0 standard drinks of alcohol     Types: 2 Drinks containing 0.5 oz of alcohol per week     Comment: Occasionally    Drug use: Never    Sexual activity: Not Currently     Partners: Male     Birth control/protection: Condom, I.U.D.     Social Drivers of Health     Financial Resource Strain: Low Risk  (4/24/2025)    Overall Financial Resource Strain (CARDIA)     Difficulty of Paying Living Expenses: Not hard at all   Food Insecurity: No Food Insecurity (4/24/2025)    Hunger Vital Sign     Worried About Running Out of Food in the Last Year: Never true     Ran Out of Food in the Last Year: Never true   Transportation Needs: No Transportation Needs (4/24/2025)    PRAPARE - Transportation     Lack of Transportation (Medical): No     Lack of Transportation (Non-Medical): No   Physical Activity: Sufficiently Active (4/24/2025)    Exercise Vital Sign     Days of Exercise per Week: 5 days     Minutes of Exercise per Session: 150+ min   Stress: Stress Concern Present (4/24/2025)    [employer] [place] [employer] - Occupational Stress Questionnaire     Feeling of Stress : To some extent   Housing Stability: Low Risk  (4/24/2025)    Housing Stability Vital Sign     Unable to Pay for Housing in the Last Year: No     Number of Times Moved in the Last Year: 0     Homeless in the Last Year: No

## 2025-08-05 ENCOUNTER — OFFICE VISIT (OUTPATIENT)
Dept: OBSTETRICS AND GYNECOLOGY | Facility: CLINIC | Age: 30
End: 2025-08-05
Payer: COMMERCIAL

## 2025-08-05 VITALS
HEIGHT: 58 IN | RESPIRATION RATE: 18 BRPM | HEART RATE: 95 BPM | DIASTOLIC BLOOD PRESSURE: 80 MMHG | BODY MASS INDEX: 34.85 KG/M2 | SYSTOLIC BLOOD PRESSURE: 120 MMHG | WEIGHT: 166 LBS

## 2025-08-05 DIAGNOSIS — B00.9 HSV (HERPES SIMPLEX VIRUS) INFECTION: ICD-10-CM

## 2025-08-05 DIAGNOSIS — Z30.432 ENCOUNTER FOR IUD REMOVAL: ICD-10-CM

## 2025-08-05 DIAGNOSIS — Z11.3 ENCOUNTER FOR SCREENING FOR INFECTIONS WITH PREDOMINANTLY SEXUAL MODE OF TRANSMISSION: ICD-10-CM

## 2025-08-05 DIAGNOSIS — Z01.419 ROUTINE GYNECOLOGICAL EXAMINATION: Primary | ICD-10-CM

## 2025-08-05 DIAGNOSIS — Z23 NEED FOR HPV VACCINE: ICD-10-CM

## 2025-08-06 ENCOUNTER — LAB VISIT (OUTPATIENT)
Dept: LAB | Facility: HOSPITAL | Age: 30
End: 2025-08-06
Attending: NURSE PRACTITIONER
Payer: COMMERCIAL

## 2025-08-06 DIAGNOSIS — Z11.3 ENCOUNTER FOR SCREENING FOR INFECTIONS WITH PREDOMINANTLY SEXUAL MODE OF TRANSMISSION: ICD-10-CM

## 2025-08-06 LAB
HBV SURFACE AG SERPL QL IA: 0.24
HBV SURFACE AG SERPL QL IA: NONREACTIVE
HIV 1+2 AB+HIV1 P24 AG SERPL QL IA: NONREACTIVE
T PALLIDUM AB SER QL: NONREACTIVE

## 2025-08-06 PROCEDURE — 86780 TREPONEMA PALLIDUM: CPT

## 2025-08-06 PROCEDURE — 87389 HIV-1 AG W/HIV-1&-2 AB AG IA: CPT

## 2025-08-06 PROCEDURE — 36415 COLL VENOUS BLD VENIPUNCTURE: CPT

## 2025-08-06 PROCEDURE — 87340 HEPATITIS B SURFACE AG IA: CPT

## (undated) DEVICE — BAG TISSUE RETRIEVAL 5MM

## (undated) DEVICE — SEE MEDLINE ITEM 146292

## (undated) DEVICE — NDL PNEUMO INSUFFLATI 120MM

## (undated) DEVICE — COVER TIP CURVED SCISSORS XI

## (undated) DEVICE — SUT VLOC 2-0 6IN 1/2 CIRCLE TP

## (undated) DEVICE — SUT GUT PL. 4-0 27 FS-2

## (undated) DEVICE — DRAPE ARM DAVINCI XI

## (undated) DEVICE — SEE MEDLINE ITEM 157117

## (undated) DEVICE — TUBING HF INSUFFLATION W/ FLTR

## (undated) DEVICE — SYR 10CC LUER LOCK

## (undated) DEVICE — SUT V-LOC GRN 30CM 12IN 2-0

## (undated) DEVICE — GLOVE PROTEXIS HYDROGEL SZ6.5

## (undated) DEVICE — NDL ECLIPSE HYPO 22GA 1IN

## (undated) DEVICE — SOL CLEARIFY VISUALIZATION LAP

## (undated) DEVICE — OBTURATOR BLADELESS 8MM XI CLR

## (undated) DEVICE — GLOVE PROTEXIS LTX 6.5

## (undated) DEVICE — Device

## (undated) DEVICE — BINDER ABDOMINAL UNIV XLN 10IN

## (undated) DEVICE — DRAPE COLUMN DAVINCI XI

## (undated) DEVICE — SOL ELECTROLUBE ANTI-STIC

## (undated) DEVICE — SEAL UNIVERSAL 5MM-8MM XI

## (undated) DEVICE — APPLICATOR CHLORAPREP ORN 26ML

## (undated) DEVICE — TOWEL OR BLUE STRL 16X26 8/PK

## (undated) DEVICE — GLOVE PROTEXIS BLUE LATEX 7

## (undated) DEVICE — SUT 0 VICRYL / UR6 (J603)

## (undated) DEVICE — BLANKET SNUGGLE WARM UPPER BDY

## (undated) DEVICE — SET TUB INSUFFLATION SUC 20L

## (undated) DEVICE — ADHESIVE DERMABOND ADVANCED

## (undated) DEVICE — TROCAR ENDO Z THREAD KII 5X100